# Patient Record
Sex: MALE | Race: WHITE | NOT HISPANIC OR LATINO | URBAN - METROPOLITAN AREA
[De-identification: names, ages, dates, MRNs, and addresses within clinical notes are randomized per-mention and may not be internally consistent; named-entity substitution may affect disease eponyms.]

---

## 2017-08-25 ENCOUNTER — INPATIENT (INPATIENT)
Facility: HOSPITAL | Age: 52
LOS: 3 days | Discharge: HOME | DRG: 271 | End: 2017-08-29
Attending: INTERNAL MEDICINE | Admitting: INTERNAL MEDICINE
Payer: MEDICAID

## 2017-08-25 VITALS — HEART RATE: 60 BPM | WEIGHT: 199.3 LBS | HEIGHT: 74 IN | OXYGEN SATURATION: 96 %

## 2017-08-25 DIAGNOSIS — I50.9 HEART FAILURE, UNSPECIFIED: ICD-10-CM

## 2017-08-25 DIAGNOSIS — I21.09 ST ELEVATION (STEMI) MYOCARDIAL INFARCTION INVOLVING OTHER CORONARY ARTERY OF ANTERIOR WALL: ICD-10-CM

## 2017-08-25 DIAGNOSIS — I23.8 OTHER CURRENT COMPLICATIONS FOLLOWING ACUTE MYOCARDIAL INFARCTION: ICD-10-CM

## 2017-08-25 LAB
ALBUMIN SERPL ELPH-MCNC: 3.8 G/DL — SIGNIFICANT CHANGE UP (ref 3.3–5)
ALBUMIN SERPL ELPH-MCNC: 4 G/DL — SIGNIFICANT CHANGE UP (ref 3.3–5)
ALBUMIN SERPL ELPH-MCNC: 4.1 G/DL — SIGNIFICANT CHANGE UP (ref 3.3–5)
ALP SERPL-CCNC: 61 U/L — SIGNIFICANT CHANGE UP (ref 40–120)
ALP SERPL-CCNC: 65 U/L — SIGNIFICANT CHANGE UP (ref 40–120)
ALP SERPL-CCNC: 69 U/L — SIGNIFICANT CHANGE UP (ref 40–120)
ALT FLD-CCNC: 43 U/L RC — SIGNIFICANT CHANGE UP (ref 10–45)
ALT FLD-CCNC: 85 U/L RC — HIGH (ref 10–45)
ALT FLD-CCNC: 89 U/L RC — HIGH (ref 10–45)
ANION GAP SERPL CALC-SCNC: 12 MMOL/L — SIGNIFICANT CHANGE UP (ref 5–17)
ANION GAP SERPL CALC-SCNC: 13 MMOL/L — SIGNIFICANT CHANGE UP (ref 5–17)
ANION GAP SERPL CALC-SCNC: 14 MMOL/L — SIGNIFICANT CHANGE UP (ref 5–17)
APTT BLD: 183.2 SEC — CRITICAL HIGH (ref 27.5–37.4)
APTT BLD: 56.5 SEC — HIGH (ref 27.5–37.4)
AST SERPL-CCNC: 193 U/L — HIGH (ref 10–40)
AST SERPL-CCNC: 499 U/L — HIGH (ref 10–40)
AST SERPL-CCNC: 528 U/L — HIGH (ref 10–40)
BASE EXCESS BLDMV CALC-SCNC: 4 MMOL/L — HIGH (ref -3–3)
BASE EXCESS BLDMV CALC-SCNC: 6 MMOL/L — HIGH (ref -3–3)
BASOPHILS # BLD AUTO: 0 K/UL — SIGNIFICANT CHANGE UP (ref 0–0.2)
BASOPHILS NFR BLD AUTO: 0.1 % — SIGNIFICANT CHANGE UP (ref 0–2)
BASOPHILS NFR BLD AUTO: 0.2 % — SIGNIFICANT CHANGE UP (ref 0–2)
BASOPHILS NFR BLD AUTO: 0.2 % — SIGNIFICANT CHANGE UP (ref 0–2)
BILIRUB SERPL-MCNC: 0.8 MG/DL — SIGNIFICANT CHANGE UP (ref 0.2–1.2)
BILIRUB SERPL-MCNC: 0.8 MG/DL — SIGNIFICANT CHANGE UP (ref 0.2–1.2)
BILIRUB SERPL-MCNC: 0.9 MG/DL — SIGNIFICANT CHANGE UP (ref 0.2–1.2)
BLD GP AB SCN SERPL QL: NEGATIVE — SIGNIFICANT CHANGE UP
BUN SERPL-MCNC: 11 MG/DL — SIGNIFICANT CHANGE UP (ref 7–23)
BUN SERPL-MCNC: 13 MG/DL — SIGNIFICANT CHANGE UP (ref 7–23)
BUN SERPL-MCNC: 13 MG/DL — SIGNIFICANT CHANGE UP (ref 7–23)
CALCIUM SERPL-MCNC: 8.5 MG/DL — SIGNIFICANT CHANGE UP (ref 8.4–10.5)
CALCIUM SERPL-MCNC: 8.8 MG/DL — SIGNIFICANT CHANGE UP (ref 8.4–10.5)
CALCIUM SERPL-MCNC: 9 MG/DL — SIGNIFICANT CHANGE UP (ref 8.4–10.5)
CHLORIDE SERPL-SCNC: 95 MMOL/L — LOW (ref 96–108)
CHLORIDE SERPL-SCNC: 95 MMOL/L — LOW (ref 96–108)
CHLORIDE SERPL-SCNC: 96 MMOL/L — SIGNIFICANT CHANGE UP (ref 96–108)
CHOLEST SERPL-MCNC: 200 MG/DL — HIGH (ref 10–199)
CK MB BLD-MCNC: 15.9 % — HIGH (ref 0–3.5)
CK MB BLD-MCNC: 9.8 % — HIGH (ref 0–3.5)
CK MB CFR SERPL CALC: 445.8 NG/ML — HIGH (ref 0–6.7)
CK MB CFR SERPL CALC: 600 NG/ML — HIGH (ref 0–6.7)
CK SERPL-CCNC: 2811 U/L — HIGH (ref 30–200)
CK SERPL-CCNC: 6152 U/L — HIGH (ref 30–200)
CO2 BLDMV-SCNC: 31 MMOL/L — HIGH (ref 21–29)
CO2 BLDMV-SCNC: 32 MMOL/L — HIGH (ref 21–29)
CO2 SERPL-SCNC: 26 MMOL/L — SIGNIFICANT CHANGE UP (ref 22–31)
CO2 SERPL-SCNC: 26 MMOL/L — SIGNIFICANT CHANGE UP (ref 22–31)
CO2 SERPL-SCNC: 27 MMOL/L — SIGNIFICANT CHANGE UP (ref 22–31)
CREAT SERPL-MCNC: 0.84 MG/DL — SIGNIFICANT CHANGE UP (ref 0.5–1.3)
CREAT SERPL-MCNC: 0.91 MG/DL — SIGNIFICANT CHANGE UP (ref 0.5–1.3)
CREAT SERPL-MCNC: 0.94 MG/DL — SIGNIFICANT CHANGE UP (ref 0.5–1.3)
EOSINOPHIL # BLD AUTO: 0 K/UL — SIGNIFICANT CHANGE UP (ref 0–0.5)
EOSINOPHIL NFR BLD AUTO: 0.1 % — SIGNIFICANT CHANGE UP (ref 0–6)
EOSINOPHIL NFR BLD AUTO: 0.3 % — SIGNIFICANT CHANGE UP (ref 0–6)
EOSINOPHIL NFR BLD AUTO: 0.3 % — SIGNIFICANT CHANGE UP (ref 0–6)
GAS PNL BLDMV: SIGNIFICANT CHANGE UP
GAS PNL BLDMV: SIGNIFICANT CHANGE UP
GLUCOSE SERPL-MCNC: 135 MG/DL — HIGH (ref 70–99)
GLUCOSE SERPL-MCNC: 135 MG/DL — HIGH (ref 70–99)
GLUCOSE SERPL-MCNC: 136 MG/DL — HIGH (ref 70–99)
HBA1C BLD-MCNC: 5.6 % — SIGNIFICANT CHANGE UP (ref 4–5.6)
HCO3 BLDMV-SCNC: 29 MMOL/L — HIGH (ref 20–28)
HCO3 BLDMV-SCNC: 31 MMOL/L — HIGH (ref 20–28)
HCT VFR BLD CALC: 40.8 % — SIGNIFICANT CHANGE UP (ref 39–50)
HCT VFR BLD CALC: 42 % — SIGNIFICANT CHANGE UP (ref 39–50)
HCT VFR BLD CALC: 43.5 % — SIGNIFICANT CHANGE UP (ref 39–50)
HDLC SERPL-MCNC: 60 MG/DL — SIGNIFICANT CHANGE UP (ref 40–125)
HGB BLD-MCNC: 14.1 G/DL — SIGNIFICANT CHANGE UP (ref 13–17)
HGB BLD-MCNC: 14.6 G/DL — SIGNIFICANT CHANGE UP (ref 13–17)
HGB BLD-MCNC: 14.8 G/DL — SIGNIFICANT CHANGE UP (ref 13–17)
HOROWITZ INDEX BLDMV+IHG-RTO: 21 — SIGNIFICANT CHANGE UP
HOROWITZ INDEX BLDMV+IHG-RTO: 21 — SIGNIFICANT CHANGE UP
INR BLD: 1.04 RATIO — SIGNIFICANT CHANGE UP (ref 0.88–1.16)
INR BLD: 1.09 RATIO — SIGNIFICANT CHANGE UP (ref 0.88–1.16)
INR BLD: 1.1 RATIO — SIGNIFICANT CHANGE UP (ref 0.88–1.16)
LIPID PNL WITH DIRECT LDL SERPL: 130 MG/DL — HIGH
LYMPHOCYTES # BLD AUTO: 1.7 K/UL — SIGNIFICANT CHANGE UP (ref 1–3.3)
LYMPHOCYTES # BLD AUTO: 1.8 K/UL — SIGNIFICANT CHANGE UP (ref 1–3.3)
LYMPHOCYTES # BLD AUTO: 11.6 % — LOW (ref 13–44)
LYMPHOCYTES # BLD AUTO: 14.6 % — SIGNIFICANT CHANGE UP (ref 13–44)
LYMPHOCYTES # BLD AUTO: 15.1 % — SIGNIFICANT CHANGE UP (ref 13–44)
LYMPHOCYTES # BLD AUTO: 2 K/UL — SIGNIFICANT CHANGE UP (ref 1–3.3)
MAGNESIUM SERPL-MCNC: 1.8 MG/DL — SIGNIFICANT CHANGE UP (ref 1.6–2.6)
MAGNESIUM SERPL-MCNC: 2.3 MG/DL — SIGNIFICANT CHANGE UP (ref 1.6–2.6)
MAGNESIUM SERPL-MCNC: 2.3 MG/DL — SIGNIFICANT CHANGE UP (ref 1.6–2.6)
MCHC RBC-ENTMCNC: 32.3 PG — SIGNIFICANT CHANGE UP (ref 27–34)
MCHC RBC-ENTMCNC: 32.8 PG — SIGNIFICANT CHANGE UP (ref 27–34)
MCHC RBC-ENTMCNC: 32.9 PG — SIGNIFICANT CHANGE UP (ref 27–34)
MCHC RBC-ENTMCNC: 34 GM/DL — SIGNIFICANT CHANGE UP (ref 32–36)
MCHC RBC-ENTMCNC: 34.6 GM/DL — SIGNIFICANT CHANGE UP (ref 32–36)
MCHC RBC-ENTMCNC: 34.7 GM/DL — SIGNIFICANT CHANGE UP (ref 32–36)
MCV RBC AUTO: 94.6 FL — SIGNIFICANT CHANGE UP (ref 80–100)
MCV RBC AUTO: 94.9 FL — SIGNIFICANT CHANGE UP (ref 80–100)
MCV RBC AUTO: 95 FL — SIGNIFICANT CHANGE UP (ref 80–100)
MONOCYTES # BLD AUTO: 1.1 K/UL — HIGH (ref 0–0.9)
MONOCYTES # BLD AUTO: 1.6 K/UL — HIGH (ref 0–0.9)
MONOCYTES # BLD AUTO: 1.6 K/UL — HIGH (ref 0–0.9)
MONOCYTES NFR BLD AUTO: 11.7 % — SIGNIFICANT CHANGE UP (ref 2–14)
MONOCYTES NFR BLD AUTO: 14.2 % — HIGH (ref 2–14)
MONOCYTES NFR BLD AUTO: 7 % — SIGNIFICANT CHANGE UP (ref 2–14)
NEUTROPHILS # BLD AUTO: 10.2 K/UL — HIGH (ref 1.8–7.4)
NEUTROPHILS # BLD AUTO: 12.5 K/UL — HIGH (ref 1.8–7.4)
NEUTROPHILS # BLD AUTO: 8 K/UL — HIGH (ref 1.8–7.4)
NEUTROPHILS NFR BLD AUTO: 70.2 % — SIGNIFICANT CHANGE UP (ref 43–77)
NEUTROPHILS NFR BLD AUTO: 73.2 % — SIGNIFICANT CHANGE UP (ref 43–77)
NEUTROPHILS NFR BLD AUTO: 81.1 % — HIGH (ref 43–77)
O2 CT VFR BLD CALC: 33 MMHG — SIGNIFICANT CHANGE UP (ref 30–65)
O2 CT VFR BLD CALC: 34 MMHG — SIGNIFICANT CHANGE UP (ref 30–65)
PCO2 BLDMV: 46 MMHG — SIGNIFICANT CHANGE UP (ref 30–65)
PCO2 BLDMV: 49 MMHG — SIGNIFICANT CHANGE UP (ref 30–65)
PH BLDMV: 7.4 — SIGNIFICANT CHANGE UP (ref 7.32–7.45)
PH BLDMV: 7.44 — SIGNIFICANT CHANGE UP (ref 7.32–7.45)
PHOSPHATE SERPL-MCNC: 3.2 MG/DL — SIGNIFICANT CHANGE UP (ref 2.5–4.5)
PHOSPHATE SERPL-MCNC: 3.5 MG/DL — SIGNIFICANT CHANGE UP (ref 2.5–4.5)
PHOSPHATE SERPL-MCNC: 3.5 MG/DL — SIGNIFICANT CHANGE UP (ref 2.5–4.5)
PLATELET # BLD AUTO: 205 K/UL — SIGNIFICANT CHANGE UP (ref 150–400)
PLATELET # BLD AUTO: 217 K/UL — SIGNIFICANT CHANGE UP (ref 150–400)
PLATELET # BLD AUTO: 233 K/UL — SIGNIFICANT CHANGE UP (ref 150–400)
POTASSIUM SERPL-MCNC: 3.9 MMOL/L — SIGNIFICANT CHANGE UP (ref 3.5–5.3)
POTASSIUM SERPL-MCNC: 3.9 MMOL/L — SIGNIFICANT CHANGE UP (ref 3.5–5.3)
POTASSIUM SERPL-MCNC: 4 MMOL/L — SIGNIFICANT CHANGE UP (ref 3.5–5.3)
POTASSIUM SERPL-SCNC: 3.9 MMOL/L — SIGNIFICANT CHANGE UP (ref 3.5–5.3)
POTASSIUM SERPL-SCNC: 3.9 MMOL/L — SIGNIFICANT CHANGE UP (ref 3.5–5.3)
POTASSIUM SERPL-SCNC: 4 MMOL/L — SIGNIFICANT CHANGE UP (ref 3.5–5.3)
PROT SERPL-MCNC: 6.3 G/DL — SIGNIFICANT CHANGE UP (ref 6–8.3)
PROT SERPL-MCNC: 6.5 G/DL — SIGNIFICANT CHANGE UP (ref 6–8.3)
PROT SERPL-MCNC: 6.6 G/DL — SIGNIFICANT CHANGE UP (ref 6–8.3)
PROTHROM AB SERPL-ACNC: 11.2 SEC — SIGNIFICANT CHANGE UP (ref 9.8–12.7)
PROTHROM AB SERPL-ACNC: 11.8 SEC — SIGNIFICANT CHANGE UP (ref 9.8–12.7)
PROTHROM AB SERPL-ACNC: 11.9 SEC — SIGNIFICANT CHANGE UP (ref 9.8–12.7)
RBC # BLD: 4.29 M/UL — SIGNIFICANT CHANGE UP (ref 4.2–5.8)
RBC # BLD: 4.44 M/UL — SIGNIFICANT CHANGE UP (ref 4.2–5.8)
RBC # BLD: 4.59 M/UL — SIGNIFICANT CHANGE UP (ref 4.2–5.8)
RBC # FLD: 11.4 % — SIGNIFICANT CHANGE UP (ref 10.3–14.5)
RBC # FLD: 11.4 % — SIGNIFICANT CHANGE UP (ref 10.3–14.5)
RBC # FLD: 11.5 % — SIGNIFICANT CHANGE UP (ref 10.3–14.5)
RH IG SCN BLD-IMP: POSITIVE — SIGNIFICANT CHANGE UP
SAO2 % BLDMV: 60 % — SIGNIFICANT CHANGE UP (ref 60–90)
SAO2 % BLDMV: 62 % — SIGNIFICANT CHANGE UP (ref 60–90)
SODIUM SERPL-SCNC: 134 MMOL/L — LOW (ref 135–145)
SODIUM SERPL-SCNC: 135 MMOL/L — SIGNIFICANT CHANGE UP (ref 135–145)
SODIUM SERPL-SCNC: 135 MMOL/L — SIGNIFICANT CHANGE UP (ref 135–145)
TOTAL CHOLESTEROL/HDL RATIO MEASUREMENT: 3.3 RATIO — LOW (ref 3.4–9.6)
TRIGL SERPL-MCNC: 51 MG/DL — SIGNIFICANT CHANGE UP (ref 10–149)
TROPONIN T SERPL-MCNC: 1.79 NG/ML — HIGH (ref 0–0.06)
TROPONIN T SERPL-MCNC: 8.04 NG/ML — HIGH (ref 0–0.06)
TSH SERPL-MCNC: 1.61 UIU/ML — SIGNIFICANT CHANGE UP (ref 0.27–4.2)
WBC # BLD: 11.4 K/UL — HIGH (ref 3.8–10.5)
WBC # BLD: 13.9 K/UL — HIGH (ref 3.8–10.5)
WBC # BLD: 15.4 K/UL — HIGH (ref 3.8–10.5)
WBC # FLD AUTO: 11.4 K/UL — HIGH (ref 3.8–10.5)
WBC # FLD AUTO: 13.9 K/UL — HIGH (ref 3.8–10.5)
WBC # FLD AUTO: 15.4 K/UL — HIGH (ref 3.8–10.5)

## 2017-08-25 PROCEDURE — 93010 ELECTROCARDIOGRAM REPORT: CPT

## 2017-08-25 PROCEDURE — 92978 ENDOLUMINL IVUS OCT C 1ST: CPT | Mod: 26,LD,GC

## 2017-08-25 PROCEDURE — 99223 1ST HOSP IP/OBS HIGH 75: CPT

## 2017-08-25 PROCEDURE — 33967 INSERT I-AORT PERCUT DEVICE: CPT

## 2017-08-25 PROCEDURE — 93460 R&L HRT ART/VENTRICLE ANGIO: CPT | Mod: 26,59,GC

## 2017-08-25 PROCEDURE — 71010: CPT | Mod: 26

## 2017-08-25 PROCEDURE — 93306 TTE W/DOPPLER COMPLETE: CPT | Mod: 26

## 2017-08-25 PROCEDURE — 76937 US GUIDE VASCULAR ACCESS: CPT | Mod: 26,GC

## 2017-08-25 PROCEDURE — 92941 PRQ TRLML REVSC TOT OCCL AMI: CPT | Mod: LD,GC

## 2017-08-25 RX ORDER — ASPIRIN/CALCIUM CARB/MAGNESIUM 324 MG
650 TABLET ORAL ONCE
Qty: 0 | Refills: 0 | Status: COMPLETED | OUTPATIENT
Start: 2017-08-25 | End: 2017-08-25

## 2017-08-25 RX ORDER — HEPARIN SODIUM 5000 [USP'U]/ML
1000 INJECTION INTRAVENOUS; SUBCUTANEOUS
Qty: 25000 | Refills: 0 | Status: DISCONTINUED | OUTPATIENT
Start: 2017-08-25 | End: 2017-08-26

## 2017-08-25 RX ORDER — MAGNESIUM SULFATE 500 MG/ML
2 VIAL (ML) INJECTION ONCE
Qty: 0 | Refills: 0 | Status: COMPLETED | OUTPATIENT
Start: 2017-08-25 | End: 2017-08-25

## 2017-08-25 RX ORDER — POTASSIUM CHLORIDE 20 MEQ
20 PACKET (EA) ORAL
Qty: 0 | Refills: 0 | Status: COMPLETED | OUTPATIENT
Start: 2017-08-25 | End: 2017-08-25

## 2017-08-25 RX ORDER — HEPARIN SODIUM 5000 [USP'U]/ML
3500 INJECTION INTRAVENOUS; SUBCUTANEOUS EVERY 6 HOURS
Qty: 0 | Refills: 0 | Status: DISCONTINUED | OUTPATIENT
Start: 2017-08-25 | End: 2017-08-26

## 2017-08-25 RX ORDER — HEPARIN SODIUM 5000 [USP'U]/ML
7000 INJECTION INTRAVENOUS; SUBCUTANEOUS EVERY 6 HOURS
Qty: 0 | Refills: 0 | Status: DISCONTINUED | OUTPATIENT
Start: 2017-08-25 | End: 2017-08-26

## 2017-08-25 RX ORDER — ALPRAZOLAM 0.25 MG
0.25 TABLET ORAL
Qty: 0 | Refills: 0 | Status: DISCONTINUED | OUTPATIENT
Start: 2017-08-25 | End: 2017-08-29

## 2017-08-25 RX ORDER — ATORVASTATIN CALCIUM 80 MG/1
80 TABLET, FILM COATED ORAL AT BEDTIME
Qty: 0 | Refills: 0 | Status: DISCONTINUED | OUTPATIENT
Start: 2017-08-25 | End: 2017-08-29

## 2017-08-25 RX ORDER — TICAGRELOR 90 MG/1
90 TABLET ORAL
Qty: 0 | Refills: 0 | Status: DISCONTINUED | OUTPATIENT
Start: 2017-08-25 | End: 2017-08-29

## 2017-08-25 RX ORDER — ASPIRIN/CALCIUM CARB/MAGNESIUM 324 MG
81 TABLET ORAL DAILY
Qty: 0 | Refills: 0 | Status: DISCONTINUED | OUTPATIENT
Start: 2017-08-25 | End: 2017-08-29

## 2017-08-25 RX ORDER — METOPROLOL TARTRATE 50 MG
12.5 TABLET ORAL
Qty: 0 | Refills: 0 | Status: DISCONTINUED | OUTPATIENT
Start: 2017-08-25 | End: 2017-08-28

## 2017-08-25 RX ORDER — HEPARIN SODIUM 5000 [USP'U]/ML
INJECTION INTRAVENOUS; SUBCUTANEOUS
Qty: 25000 | Refills: 0 | Status: DISCONTINUED | OUTPATIENT
Start: 2017-08-25 | End: 2017-08-25

## 2017-08-25 RX ADMIN — Medication 12.5 MILLIGRAM(S): at 18:15

## 2017-08-25 RX ADMIN — HEPARIN SODIUM 1000 UNIT(S)/HR: 5000 INJECTION INTRAVENOUS; SUBCUTANEOUS at 15:04

## 2017-08-25 RX ADMIN — TICAGRELOR 90 MILLIGRAM(S): 90 TABLET ORAL at 16:48

## 2017-08-25 RX ADMIN — ATORVASTATIN CALCIUM 80 MILLIGRAM(S): 80 TABLET, FILM COATED ORAL at 22:48

## 2017-08-25 RX ADMIN — Medication 50 GRAM(S): at 11:47

## 2017-08-25 RX ADMIN — Medication 0.25 MILLIGRAM(S): at 16:48

## 2017-08-25 RX ADMIN — Medication 20 MILLIEQUIVALENT(S): at 11:46

## 2017-08-25 RX ADMIN — Medication 650 MILLIGRAM(S): at 16:48

## 2017-08-25 RX ADMIN — HEPARIN SODIUM 1200 UNIT(S)/HR: 5000 INJECTION INTRAVENOUS; SUBCUTANEOUS at 18:16

## 2017-08-25 RX ADMIN — Medication 81 MILLIGRAM(S): at 11:46

## 2017-08-25 RX ADMIN — Medication 20 MILLIEQUIVALENT(S): at 15:03

## 2017-08-25 RX ADMIN — HEPARIN SODIUM 1600 UNIT(S)/HR: 5000 INJECTION INTRAVENOUS; SUBCUTANEOUS at 09:24

## 2017-08-25 NOTE — H&P ADULT - ATTENDING COMMENTS
52 year-old man with LAD infarct, PCWP=31 mmHg. IABP and swan-diego placed. Patient given IV lasix with improvement of his volume status. Augmented BPs=80s-90s mmHg. TTE with IV contrast to rule out LV thrombus.

## 2017-08-25 NOTE — PROGRESS NOTE ADULT - ASSESSMENT
55 y/o M with no significant medical history, recently stopped smoking, recent negative cardiac workup presents with 3 days of intermittent resting chest pain.  Found to have AW STEMI , RCA disease with elevated filling pressures requiring IABP

## 2017-08-25 NOTE — H&P ADULT - ASSESSMENT
55 Y/O Male with no significant medical or surgical history, recently stopped smoking.  Past 3 days pt had  progressive mid sternal chest pain at rest.  This evening chest pain was unrelenting and he was found to have an AW STEMI at outside hospital. Denies palpitations or radiation.  Urgent cath showed 100% pLAD with 3 EZRA.  !00% RCA with collaterals.  IABP and swan were placed.  PCWP 31, CI 2.5.  Lasix 20 mgm IV was given        2 months ago had negative cardiac work up including stress test and echo due to episodes of chest pain that were not typical of current chest pain.

## 2017-08-25 NOTE — H&P ADULT - HISTORY OF PRESENT ILLNESS
55 Y/o M No significant medical history, stopped smoking past month after cardiac work-up for chest pain which proved negative. Pain at that time was dissimilar from his current  admitting chest  discomfort.   He states 3 days ago he developed mid sternal chest pain with associated diaphoresis while at rest, pain lasted up to 30 minutes.  The following day it reoccurred and  lasted fa little longer.   Last evening it reoccurred with greater intensity and duration.  On presentation to [place] it was noted he had an Anterior wall STEMI.  He was loaded with Clopidogril, Aspirin, Heparin and MSO4 and transferred to [facility] for urgent cardiac catheterization.  Cath showed 100% stenosis in p LAD.  EZRA X3.  RCA was 100% with collaterals .  PCWP 31.  CO 2.54 PA Sat 69% PAP 47/26/35. IABP support and right hear cath was placed.   Lasix 20 mgm IV was given. 55 Y/o M No significant medical history, stopped smoking past month after cardiac work-up for chest pain which proved negative. Pain at that time was dissimilar from his current  admitting chest  discomfort.   He states 3 days ago he developed mid sternal chest pain with associated diaphoresis while at rest, pain lasted up to 30 minutes.  The following day it reoccurred and  lasted fa little longer.   Last evening it reoccurred with greater intensity and duration.  On presentation to [place] it was noted he had an Anterior wall STEMI.  He was loaded with Clopidogril, Aspirin, Heparin and MSO4 and transferred to [facility] for urgent cardiac catheterization.  Cath showed 100% stenosis in p LAD.  EZRA X3.  RCA was 100% with collaterals .  PCWP 31.  CI 2.54 PA Sat 69% PAP 47/26/35. IABP support and right heart cath was placed.   Lasix 20 mgm IV was given. 53 Y/o M No significant medical history, stopped smoking past month after cardiac work-up for chest pain which proved negative. Pain at that time was dissimilar from his current  admitting chest  discomfort.   He states 3 days ago he developed mid sternal chest pain with associated diaphoresis while at rest, pain lasted up to 30 minutes.  The following day it reoccurred and  lasted fa little longer.   Last evening it reoccurred with greater intensity and duration.  On presentation to [facility] it was noted he had an Anterior wall STEMI.  He was loaded with Clopidogril, Aspirin, Heparin and MSO4 and transferred to [facility] for urgent cardiac catheterization.  Cath showed 100% stenosis in p LAD.  EZRA X3.  RCA was 100% with collaterals .  PCWP 31.  CI 2.54 PA Sat 69% PAP 47/26/35. IABP support and right heart cath was placed.   Lasix 20 mgm IV was given.

## 2017-08-25 NOTE — PROGRESS NOTE ADULT - SUBJECTIVE AND OBJECTIVE BOX
HPI:  57 Y/o M No significant medical history, stopped smoking past month after cardiac work-up for chest pain which proved negative. Pain at that time was dissimilar from his current  admitting chest  discomfort.   He states 3 days ago he developed mid sternal chest pain with associated diaphoresis while at rest, pain lasted up to 30 minutes.  The following day it reoccurred and  lasted fa little longer.   Last evening it reoccurred with greater intensity and duration.  On presentation to [facility] it was noted he had an Anterior wall STEMI.  He was loaded with Clopidogril, Aspirin, Heparin and MSO4 and transferred to [facility] for urgent cardiac catheterization.  Cath showed 100% stenosis in p LAD.  EZRA X3.  RCA was 100% with collaterals .  PCWP 31.  CO 2.54 PA Sat 69% PAP 47/26/35. IABP support and right hear cath was placed.   Lasix 20 mgm IV was given. (25 Aug 2017 07:49)        Overnight Events:admitted 7 AM.  Lasix 20 mgm IV given for elevated filling pressurs    OBJECTIVE:  Review Of Systems:    Constitutional: No chest pain   Respiratory: Denies dyspnea   Cardiovascular: No chest pain        Allergy:  Allergies    No Known Allergies    Intolerances        Medications:  MEDICATIONS  (STANDING):  aspirin enteric coated 81 milliGRAM(s) Oral daily  ticagrelor 90 milliGRAM(s) Oral two times a day  atorvastatin 80 milliGRAM(s) Oral at bedtime  heparin  Infusion.  Unit(s)/Hr (16 mL/Hr) IV Continuous <Continuous>    PMH/PSH/FH/SH: [ ] Unchanged    Vitals:  T(C): --  HR: 68 (08-25-17 @ 08:00) (68 - 68)  BP: --augmented 80-90   BP(mean): --  RR: --18  SpO2: --  CVP 7-8  PAP 35/25  PCWP 18-20  Wt(kg): --  Daily Height in cm: 187.96 (25 Aug 2017 07:15)    Daily   I&O's Summary      Labs:                            ECG:Sinus rhythm PVC's .  Poor r wave progression     Echo:    Stress Testing:     Cath: EZRA X 3 to  p LAD.  RCA with collaterals     Imaging:    Interpretation of Telemetry:Sinus rhythm with PVC's      Physical Exam:    Appearance: well developed   Cardiovascular:S1 S2 without gallop  or murmur   Procedural Access Site:Right groin with IABP and [place]  Respiratory:Lungs clear  Gastrointestinal: Soft ND, NT  Extremities: (+) Distal LE pulses

## 2017-08-25 NOTE — CHART NOTE - NSCHARTNOTEFT_GEN_A_CORE
====================  SUMMARY:  ====================      ====================  NEW EVENTS:  ====================      ====================  VITALS:  ====================    ICU Vital Signs Last 24 Hrs  T(C): 36.6 (25 Aug 2017 19:00), Max: 36.6 (25 Aug 2017 19:00)  T(F): 97.9 (25 Aug 2017 19:00), Max: 97.9 (25 Aug 2017 19:00)  HR: 58 (25 Aug 2017 22:30) (58 - 78)  BP: 82/61 (25 Aug 2017 19:45) (82/61 - 82/61)  BP(mean): 68 (25 Aug 2017 19:45) (68 - 68)  ABP: --  ABP(mean): --  RR: 18 (25 Aug 2017 22:30) (16 - 20)  SpO2: 95% (25 Aug 2017 22:30) (91% - 99%)      I&O's Summary    25 Aug 2017 07:01  -  25 Aug 2017 23:18  --------------------------------------------------------  IN: 896 mL / OUT: 2950 mL / NET: -2054 mL    ====================  LABS:  ====================                          14.6   13.9  )-----------( 217      ( 25 Aug 2017 17:09 )             42.0     08-25    135  |  95<L>  |  13  ----------------------------<  135<H>  4.0   |  26  |  0.94    Ca    9.0      25 Aug 2017 17:09  Phos  3.5     08-25  Mg     2.3     08-25    TPro  6.5  /  Alb  4.0  /  TBili  0.8  /  DBili  x   /  AST  528<H>  /  ALT  85<H>  /  AlkPhos  65  08-25    PT/INR - ( 25 Aug 2017 17:09 )   PT: 11.2 sec;   INR: 1.04 ratio         PTT - ( 25 Aug 2017 17:09 )  PTT:56.5 sec  Troponin T, Serum: 8.04 ng/mL <H> (08-25-17 @ 17:09)  Creatine Kinase, Serum: 6152 U/L <H> (08-25-17 @ 17:09)  Creatine Kinase, Serum: 2811 U/L <H> (08-25-17 @ 09:16)  Troponin T, Serum: 1.79 ng/mL <H> (08-25-17 @ 09:16)        ====================  PLAN:  ====================      Shmuel Hernandez M.D.  Medicine Resident, PGY-2  Pager: [phone] ====================  SUMMARY:  ====================  57 y/o M with no significant medical history, recently stopped smoking, recent negative cardiac workup presents with 3 days of intermittent resting chest pain.  Found to have AW STEMI , RCA disease with elevated filling pressures requiring IABP    ====================  NEW EVENTS:  ====================  Several short episodes of venticular arrhythmias, given Amio bolus. Currently CP free. IABP in place.     ====================  VITALS:  ====================    ICU Vital Signs Last 24 Hrs  T(C): 36.6 (25 Aug 2017 19:00), Max: 36.6 (25 Aug 2017 19:00)  T(F): 97.9 (25 Aug 2017 19:00), Max: 97.9 (25 Aug 2017 19:00)  HR: 58 (25 Aug 2017 22:30) (58 - 78)  BP: 82/61 (25 Aug 2017 19:45) (82/61 - 82/61)  BP(mean): 68 (25 Aug 2017 19:45) (68 - 68)  ABP: --  ABP(mean): --  RR: 18 (25 Aug 2017 22:30) (16 - 20)  SpO2: 95% (25 Aug 2017 22:30) (91% - 99%)      I&O's Summary    25 Aug 2017 07:01  -  25 Aug 2017 23:18  --------------------------------------------------------  IN: 896 mL / OUT: 2950 mL / NET: -2054 mL    ====================  LABS:  ====================                          14.6   13.9  )-----------( 217      ( 25 Aug 2017 17:09 )             42.0     08-25    135  |  95<L>  |  13  ----------------------------<  135<H>  4.0   |  26  |  0.94    Ca    9.0      25 Aug 2017 17:09  Phos  3.5     08-25  Mg     2.3     08-25    TPro  6.5  /  Alb  4.0  /  TBili  0.8  /  DBili  x   /  AST  528<H>  /  ALT  85<H>  /  AlkPhos  65  08-25    PT/INR - ( 25 Aug 2017 17:09 )   PT: 11.2 sec;   INR: 1.04 ratio         PTT - ( 25 Aug 2017 17:09 )  PTT:56.5 sec  Troponin T, Serum: 8.04 ng/mL <H> (08-25-17 @ 17:09)  Creatine Kinase, Serum: 6152 U/L <H> (08-25-17 @ 17:09)  Creatine Kinase, Serum: 2811 U/L <H> (08-25-17 @ 09:16)  Troponin T, Serum: 1.79 ng/mL <H> (08-25-17 @ 09:16)        ====================  PLAN:  ====================  #AWSTEMI: s/p EZRA x3. On ASA/Brilinta, BB, and Statin. will add ACEI as BP allows. will need repeat TTE after 48 hrs to r/o LV thrombus. continue to trend CE    #CHF: EF 20-25%, severe LV dysfunction. IABP to augment pressures    #Arrhythmias: post-MI, including NSVT/AIVR. monitor on Tele    Shmuel Hernandez M.D.  Medicine Resident, PGY-2  Pager: [phone] ====================  SUMMARY:  ====================  57 y/o M with no significant medical history, recently stopped smoking, recent negative cardiac workup presents with 3 days of intermittent resting chest pain.  Found to have AW STEMI , RCA disease with elevated filling pressures requiring IABP    ====================  NEW EVENTS:  ====================  Several short episodes of venticular arrhythmias, given Amio bolus. Currently CP free. IABP in place.     ====================  VITALS:  ====================    ICU Vital Signs Last 24 Hrs  T(C): 36.6 (25 Aug 2017 19:00), Max: 36.6 (25 Aug 2017 19:00)  T(F): 97.9 (25 Aug 2017 19:00), Max: 97.9 (25 Aug 2017 19:00)  HR: 58 (25 Aug 2017 22:30) (58 - 78)  BP: 82/61 (25 Aug 2017 19:45) (82/61 - 82/61)  BP(mean): 68 (25 Aug 2017 19:45) (68 - 68)  ABP: --   RR: 18 (25 Aug 2017 22:30) (16 - 20)  SpO2: 95% (25 Aug 2017 22:30) (91% - 99%)      I&O's Summary    25 Aug 2017 07:01  -  25 Aug 2017 23:18  --------------------------------------------------------  IN: 896 mL / OUT: 2950 mL / NET: -2054 mL    ====================  LABS:  ====================                          14.6   13.9  )-----------( 217      ( 25 Aug 2017 17:09 )             42.0     08-25    135  |  95<L>  |  13  ----------------------------<  135<H>  4.0   |  26  |  0.94    Ca    9.0      25 Aug 2017 17:09  Phos  3.5     08-25  Mg     2.3     08-25    TPro  6.5  /  Alb  4.0  /  TBili  0.8  /  DBili  x   /  AST  528<H>  /  ALT  85<H>  /  AlkPhos  65  08-25    PT/INR - ( 25 Aug 2017 17:09 )   PT: 11.2 sec;   INR: 1.04 ratio         PTT - ( 25 Aug 2017 17:09 )  PTT:56.5 sec  Troponin T, Serum: 8.04 ng/mL <H> (08-25-17 @ 17:09)  Creatine Kinase, Serum: 6152 U/L <H> (08-25-17 @ 17:09)  Creatine Kinase, Serum: 2811 U/L <H> (08-25-17 @ 09:16)  Troponin T, Serum: 1.79 ng/mL <H> (08-25-17 @ 09:16)        ====================  PLAN:  ====================  #AWSTEMI: s/p EZRA x3. On ASA/Brilinta, BB, and Statin. will add ACEI as BP allows. will need repeat TTE after 48 hrs to r/o LV thrombus. CE have peaked    #CHF: EF 20-25%, severe LV dysfunction. IABP to augment pressures    #Arrhythmias: post-MI, including NSVT/AIVR. monitor on Tele    Shmuel Hernandez M.D.  Medicine Resident, PGY-2  Pager: [phone]

## 2017-08-26 DIAGNOSIS — I47.2 VENTRICULAR TACHYCARDIA: ICD-10-CM

## 2017-08-26 LAB
ANION GAP SERPL CALC-SCNC: 11 MMOL/L — SIGNIFICANT CHANGE UP (ref 5–17)
APTT BLD: 76.4 SEC — HIGH (ref 27.5–37.4)
APTT BLD: 76.5 SEC — HIGH (ref 27.5–37.4)
BASE EXCESS BLDMV CALC-SCNC: 6.4 MMOL/L — HIGH (ref -3–3)
BASOPHILS # BLD AUTO: 0 K/UL — SIGNIFICANT CHANGE UP (ref 0–0.2)
BASOPHILS NFR BLD AUTO: 0.5 % — SIGNIFICANT CHANGE UP (ref 0–2)
BUN SERPL-MCNC: 10 MG/DL — SIGNIFICANT CHANGE UP (ref 7–23)
CALCIUM SERPL-MCNC: 8.5 MG/DL — SIGNIFICANT CHANGE UP (ref 8.4–10.5)
CHLORIDE SERPL-SCNC: 100 MMOL/L — SIGNIFICANT CHANGE UP (ref 96–108)
CK MB BLD-MCNC: 4.6 % — HIGH (ref 0–3.5)
CK MB BLD-MCNC: 9.4 % — HIGH (ref 0–3.5)
CK MB CFR SERPL CALC: 465.3 NG/ML — HIGH (ref 0–6.7)
CK MB CFR SERPL CALC: 84.6 NG/ML — HIGH (ref 0–6.7)
CK SERPL-CCNC: 1830 U/L — HIGH (ref 30–200)
CK SERPL-CCNC: 4924 U/L — HIGH (ref 30–200)
CO2 BLDMV-SCNC: 33 MMOL/L — HIGH (ref 21–29)
CO2 SERPL-SCNC: 26 MMOL/L — SIGNIFICANT CHANGE UP (ref 22–31)
CREAT SERPL-MCNC: 0.87 MG/DL — SIGNIFICANT CHANGE UP (ref 0.5–1.3)
EOSINOPHIL # BLD AUTO: 0.1 K/UL — SIGNIFICANT CHANGE UP (ref 0–0.5)
EOSINOPHIL NFR BLD AUTO: 0.6 % — SIGNIFICANT CHANGE UP (ref 0–6)
GAS PNL BLDMV: SIGNIFICANT CHANGE UP
GLUCOSE SERPL-MCNC: 117 MG/DL — HIGH (ref 70–99)
HCO3 BLDMV-SCNC: 32 MMOL/L — HIGH (ref 20–28)
HCT VFR BLD CALC: 40.8 % — SIGNIFICANT CHANGE UP (ref 39–50)
HGB BLD-MCNC: 14.2 G/DL — SIGNIFICANT CHANGE UP (ref 13–17)
HOROWITZ INDEX BLDMV+IHG-RTO: 21 — SIGNIFICANT CHANGE UP
LYMPHOCYTES # BLD AUTO: 2.6 K/UL — SIGNIFICANT CHANGE UP (ref 1–3.3)
LYMPHOCYTES # BLD AUTO: 25.9 % — SIGNIFICANT CHANGE UP (ref 13–44)
MAGNESIUM SERPL-MCNC: 2.2 MG/DL — SIGNIFICANT CHANGE UP (ref 1.6–2.6)
MCHC RBC-ENTMCNC: 33.5 PG — SIGNIFICANT CHANGE UP (ref 27–34)
MCHC RBC-ENTMCNC: 34.9 GM/DL — SIGNIFICANT CHANGE UP (ref 32–36)
MCV RBC AUTO: 96.1 FL — SIGNIFICANT CHANGE UP (ref 80–100)
MONOCYTES # BLD AUTO: 1.3 K/UL — HIGH (ref 0–0.9)
MONOCYTES NFR BLD AUTO: 12.7 % — SIGNIFICANT CHANGE UP (ref 2–14)
NEUTROPHILS # BLD AUTO: 6 K/UL — SIGNIFICANT CHANGE UP (ref 1.8–7.4)
NEUTROPHILS NFR BLD AUTO: 60.3 % — SIGNIFICANT CHANGE UP (ref 43–77)
O2 CT VFR BLD CALC: 34 MMHG — SIGNIFICANT CHANGE UP (ref 30–65)
PCO2 BLDMV: 49 MMHG — SIGNIFICANT CHANGE UP (ref 30–65)
PH BLDMV: 7.42 — SIGNIFICANT CHANGE UP (ref 7.32–7.45)
PHOSPHATE SERPL-MCNC: 2.9 MG/DL — SIGNIFICANT CHANGE UP (ref 2.5–4.5)
PLATELET # BLD AUTO: 206 K/UL — SIGNIFICANT CHANGE UP (ref 150–400)
POTASSIUM SERPL-MCNC: 4 MMOL/L — SIGNIFICANT CHANGE UP (ref 3.5–5.3)
POTASSIUM SERPL-SCNC: 4 MMOL/L — SIGNIFICANT CHANGE UP (ref 3.5–5.3)
RBC # BLD: 4.25 M/UL — SIGNIFICANT CHANGE UP (ref 4.2–5.8)
RBC # FLD: 11.4 % — SIGNIFICANT CHANGE UP (ref 10.3–14.5)
SAO2 % BLDMV: 62 % — SIGNIFICANT CHANGE UP (ref 60–90)
SODIUM SERPL-SCNC: 137 MMOL/L — SIGNIFICANT CHANGE UP (ref 135–145)
TROPONIN T SERPL-MCNC: 7.45 NG/ML — HIGH (ref 0–0.06)
TROPONIN T SERPL-MCNC: 8.36 NG/ML — HIGH (ref 0–0.06)
WBC # BLD: 10 K/UL — SIGNIFICANT CHANGE UP (ref 3.8–10.5)
WBC # FLD AUTO: 10 K/UL — SIGNIFICANT CHANGE UP (ref 3.8–10.5)

## 2017-08-26 PROCEDURE — 71010: CPT | Mod: 26

## 2017-08-26 PROCEDURE — 93010 ELECTROCARDIOGRAM REPORT: CPT

## 2017-08-26 PROCEDURE — 99233 SBSQ HOSP IP/OBS HIGH 50: CPT

## 2017-08-26 RX ORDER — SODIUM CHLORIDE 9 MG/ML
250 INJECTION INTRAMUSCULAR; INTRAVENOUS; SUBCUTANEOUS ONCE
Qty: 0 | Refills: 0 | Status: COMPLETED | OUTPATIENT
Start: 2017-08-26 | End: 2017-08-26

## 2017-08-26 RX ORDER — ACETAMINOPHEN 500 MG
650 TABLET ORAL ONCE
Qty: 0 | Refills: 0 | Status: COMPLETED | OUTPATIENT
Start: 2017-08-26 | End: 2017-08-26

## 2017-08-26 RX ADMIN — HEPARIN SODIUM 1200 UNIT(S)/HR: 5000 INJECTION INTRAVENOUS; SUBCUTANEOUS at 08:18

## 2017-08-26 RX ADMIN — Medication 12.5 MILLIGRAM(S): at 06:39

## 2017-08-26 RX ADMIN — Medication 650 MILLIGRAM(S): at 02:08

## 2017-08-26 RX ADMIN — Medication 650 MILLIGRAM(S): at 02:30

## 2017-08-26 RX ADMIN — ATORVASTATIN CALCIUM 80 MILLIGRAM(S): 80 TABLET, FILM COATED ORAL at 21:36

## 2017-08-26 RX ADMIN — HEPARIN SODIUM 1200 UNIT(S)/HR: 5000 INJECTION INTRAVENOUS; SUBCUTANEOUS at 01:10

## 2017-08-26 RX ADMIN — Medication 0.25 MILLIGRAM(S): at 01:43

## 2017-08-26 RX ADMIN — Medication 12.5 MILLIGRAM(S): at 18:17

## 2017-08-26 RX ADMIN — TICAGRELOR 90 MILLIGRAM(S): 90 TABLET ORAL at 18:17

## 2017-08-26 RX ADMIN — Medication 81 MILLIGRAM(S): at 12:17

## 2017-08-26 RX ADMIN — SODIUM CHLORIDE 500 MILLILITER(S): 9 INJECTION INTRAMUSCULAR; INTRAVENOUS; SUBCUTANEOUS at 11:17

## 2017-08-26 RX ADMIN — TICAGRELOR 90 MILLIGRAM(S): 90 TABLET ORAL at 05:21

## 2017-08-26 NOTE — CHART NOTE - NSCHARTNOTEFT_GEN_A_CORE
====================  SUMMARY:  ====================  55 y/o M with no significant medical history, recently stopped smoking, recent negative cardiac workup presents with 3 days of intermittent resting chest pain.  Found to have AW STEMI, RCA disease with elevated filling pressures requiring IABP    ====================  NEW EVENTS:  ====================  IABP and [place] removed. CE have not peaked.    ====================  VITALS:  ====================    ICU Vital Signs Last 24 Hrs  T(C): 37.2 (26 Aug 2017 19:00), Max: 37.7 (26 Aug 2017 16:00)  T(F): 98.9 (26 Aug 2017 19:00), Max: 99.9 (26 Aug 2017 16:00)  HR: 64 (26 Aug 2017 21:00) (54 - 76)  BP: 92/68 (26 Aug 2017 21:00) (81/71 - 103/73)  BP(mean): 78 (26 Aug 2017 21:00) (75 - 85)  RR: 18 (26 Aug 2017 19:00) (17 - 22)  SpO2: 97% (26 Aug 2017 19:00) (92% - 97%)      I&O's Summary    25 Aug 2017 07:01  -  26 Aug 2017 07:00  --------------------------------------------------------  IN: 992 mL / OUT: 3950 mL / NET: -2958 mL    26 Aug 2017 07:01  -  26 Aug 2017 22:04  --------------------------------------------------------  IN: 714 mL / OUT: 550 mL / NET: 164 mL        Adult Advanced Hemodynamics Last 24 Hrs  CVP(mm Hg): 19 (26 Aug 2017 21:00) (3 - 19)  CO: 4 (25 Aug 2017 23:30) (4 - 4)  CI: 1.8 (25 Aug 2017 23:30) (1.8 - 1.8)  PA: 9/5 (26 Aug 2017 21:00) (9/5 - 42/24)  PA(mean): 8 (26 Aug 2017 21:00) (8 - 30)    ====================  LABS:  ====================                          14.2   10.0  )-----------( 206      ( 26 Aug 2017 06:34 )             40.8     08-26    137  |  100  |  10  ----------------------------<  117<H>  4.0   |  26  |  0.87    Ca    8.5      26 Aug 2017 06:34  Phos  2.9     08-26  Mg     2.2     08-26    TPro  6.3  /  Alb  3.8  /  TBili  0.9  /  DBili  x   /  AST  499<H>  /  ALT  89<H>  /  AlkPhos  61  08-25    PT/INR - ( 25 Aug 2017 23:21 )   PT: 11.8 sec;   INR: 1.09 ratio         PTT - ( 26 Aug 2017 06:34 )  PTT:76.5 sec  Troponin T, Serum: 8.36 ng/mL <H> (08-26-17 @ 16:56)  Creatine Kinase, Serum: 1830 U/L <H> (08-26-17 @ 16:56)  Troponin T, Serum: 7.45 ng/mL <H> (08-25-17 @ 23:21)  Creatine Kinase, Serum: 4924 U/L <H> (08-25-17 @ 23:21)    ====================  PLAN:  ====================  #AWSTEMI: s/p EZRA x3. On ASA/Brilinta, BB, and Statin. will add ACEI as BP allows. will need repeat TTE to r/o LV thrombus. continue to trend CE    #CHF: EF 20-25%, severe LV dysfunction. will need LifeVest at discharge    #Ventricular Arrhythmias: post-MI, c/w beta-blocker. no events on Tele for >12hrs, continue to monitor    Shmuel Hernandez M.D.  Medicine Resident, PGY-2  Pager: [phone]

## 2017-08-26 NOTE — CHART NOTE - NSCHARTNOTEFT_GEN_A_CORE
Removal of Femoral Sheath (IABP)    Pulses in the right lower extremity are palpable. The patient was placed in the supine position. The insertion site was identified and the sutures were removed per protocol.  The 8 [place] femoral sheath was then removed. Direct pressure was applied for  __35_ minutes.     Monitoring of the (right/left) groin and both lower extremities including neuro-vascular checks and vital signs every 15 minutes x 4, then every 30 minutes x 2, then every 1 hour was ordered.    Complications: None/Other    Comments: The patient tolerated the pull well, the patient received 200cc if IVF during the pull

## 2017-08-26 NOTE — PROGRESS NOTE ADULT - PROBLEM SELECTOR PLAN 1
DAPT, Statin, Beta Blocker.  ACEI when B/P Allows  Follow filling pressures w [place] Brenda catheter  Plan D/C IABP today

## 2017-08-26 NOTE — PROGRESS NOTE ADULT - SUBJECTIVE AND OBJECTIVE BOX
HPI:  53 Y/o M No significant medical history, stopped smoking past month after cardiac work-up for chest pain which proved negative. Pain at that time was dissimilar from his current  admitting chest  discomfort.   He states 3 days ago he developed mid sternal chest pain with associated diaphoresis while at rest, pain lasted up to 30 minutes.  The following day it reoccurred and  lasted fa little longer.   Last evening it reoccurred with greater intensity and duration.  On presentation to [place] it was noted he had an Anterior wall STEMI.  He was loaded with Clopidogril, Aspirin, Heparin and MSO4 and transferred to [facility] for urgent cardiac catheterization.  Cath showed 100% stenosis in p LAD.  EZRA X3.  RCA was 100% with collaterals .  PCWP 31.  CI 2.54 PA Sat 69% PAP 47/26/35. IABP support and right heart cath was placed.   Lasix 20 mgm IV was given. (25 Aug 2017 07:49)        Overnight Events:Ventricular ectopy less on BB     OBJECTIVE:  Review Of Systems:    Constitutional:   Respiratory:   Cardiovascular:         Allergy:  Allergies    No Known Allergies    Intolerances        Medications:  MEDICATIONS  (STANDING):  aspirin enteric coated 81 milliGRAM(s) Oral daily  ticagrelor 90 milliGRAM(s) Oral two times a day  atorvastatin 80 milliGRAM(s) Oral at bedtime  heparin  Infusion. 1000 Unit(s)/Hr (10 mL/Hr) IV Continuous <Continuous>  metoprolol 12.5 milliGRAM(s) Oral two times a day    MEDICATIONS  (PRN):  heparin  Injectable 7000 Unit(s) IV Push every 6 hours PRN For aPTT less than 40  heparin  Injectable 3500 Unit(s) IV Push every 6 hours PRN For aPTT between 40 - 57  ALPRAZolam 0.25 milliGRAM(s) Oral four times a day PRN anxiety      PMH/PSH/FH/SH: [ ] Unchanged    Vitals:  T(C): 37.5 (17 @ 05:00), Max: 37.5 (17 @ 05:00)  HR: 556 (17 @ 07:00) (54 - 556)  BP: 82/61 (17 @ 19:45) (82/61 - 82/61)  BP(mean): 68 (17 @ 19:45) (68 - 68)  RR: 22 (17 @ 07:00) (16 - 22)  SpO2: 95% (17 @ 07:00) (91% - 99%)  Wt(kg): --  Daily     Daily   I&O's Summary    25 Aug 2017 07:01  -  26 Aug 2017 07:00  --------------------------------------------------------  IN: 968 mL / OUT: 3950 mL / NET: -2982 mL        Labs:                        14.2   10.0  )-----------( 206      ( 26 Aug 2017 06:34 )             40.8         137  |  100  |  10  ----------------------------<  117<H>  4.0   |  26  |  0.87    Ca    8.5      26 Aug 2017 06:34  Phos  2.9       Mg     2.2         TPro  6.3  /  Alb  3.8  /  TBili  0.9  /  DBili  x   /  AST  499<H>  /  ALT  89<H>  /  AlkPhos  61      PT/INR - ( 25 Aug 2017 23:21 )   PT: 11.8 sec;   INR: 1.09 ratio         PTT - ( 26 Aug 2017 06:34 )  PTT:76.5 sec  CARDIAC MARKERS ( 25 Aug 2017 23:21 )  x     / 7.45 ng/mL / 4924 U/L / x     / 465.3 ng/mL  CARDIAC MARKERS ( 25 Aug 2017 17:09 )  x     / 8.04 ng/mL / 6152 U/L / x     / 600.0 ng/mL  CARDIAC MARKERS ( 25 Aug 2017 09:16 )  x     / 1.79 ng/mL / 2811 U/L / x     / 445.8 ng/mL      Magnesium, Serum: 2.2 mg/dL ( @ 06:34)  Phosphorus Level, Serum: 2.9 mg/dL ( 06:34)  Phosphorus Level, Serum: 3.2 mg/dL ( @ 23:21)  Magnesium, Serum: 2.3 mg/dL ( @ 23:21)  Magnesium, Serum: 2.3 mg/dL ( @ 17:09)  Phosphorus Level, Serum: 3.5 mg/dL ( @ 17:09)  Magnesium, Serum: 1.8 mg/dL ( @ 09:16)  Phosphorus Level, Serum: 3.5 mg/dL ( @ 09:16)    Total Cholesterol: 200  LDL: 130  HDL: 60  T            ECG:    Echo:  < from: TTE with Doppler (w/Cont) (17 @ 10:58) >   Severe segmental left ventricular systolic dysfunction.  Severe hypokinesis/akinesis of the anteroseptum, anterior  wall., apex, distal inferior/inferolateral wall.  Endocardial visualization enhanced with intravenous    < end of copied text >    Stress Testing:     Cath: :  EZRA X 3 to pLAD. PCWP 31, 100% RCA w collaterals .  CI 2.5    Imaging:    Interpretation of Telemetry:Sinus with 1 run of NSVT      Physical Exam:    Appearance:   Cardiovascular:   Procedural Access Site:  Respiratory:  Gastrointestinal:   Extremities: HPI:  51 Y/o M No significant medical history, stopped smoking past month after cardiac work-up for chest pain which proved negative. Pain at that time was dissimilar from his current  admitting chest  discomfort.   He states 3 days ago he developed mid sternal chest pain with associated diaphoresis while at rest, pain lasted up to 30 minutes.  The following day it reoccurred and  lasted fa little longer.   Last evening it reoccurred with greater intensity and duration.  On presentation to [place] it was noted he had an Anterior wall STEMI.  He was loaded with Clopidogril, Aspirin, Heparin and MSO4 and transferred to [facility] for urgent cardiac catheterization.  Cath showed 100% stenosis in p LAD.  EZRA X3.  RCA was 100% with collaterals .  PCWP 31.  CI 2.54 PA Sat 69% PAP 47/26/35. IABP support and right heart cath was placed.   Lasix 20 mgm IV was given. (25 Aug 2017 07:49)        Overnight Events:Ventricular ectopy less on BB     OBJECTIVE:  Review Of Systems:     Constitutional:No complaints   Respiratory: Clear B/L   Cardiac:  S1S2 no M/G .  No JVD         Allergy:  Allergies    No Known Allergies    Intolerances        Medications:  MEDICATIONS  (STANDING):  aspirin enteric coated 81 milliGRAM(s) Oral daily  ticagrelor 90 milliGRAM(s) Oral two times a day  atorvastatin 80 milliGRAM(s) Oral at bedtime  heparin  Infusion. 1000 Unit(s)/Hr (10 mL/Hr) IV Continuous <Continuous>  metoprolol 12.5 milliGRAM(s) Oral two times a day    MEDICATIONS  (PRN):  heparin  Injectable 7000 Unit(s) IV Push every 6 hours PRN For aPTT less than 40  heparin  Injectable 3500 Unit(s) IV Push every 6 hours PRN For aPTT between 40 - 57  ALPRAZolam 0.25 milliGRAM(s) Oral four times a day PRN anxiety      PMH/PSH/FH/SH: [ ] Unchanged    Vitals:  T(C): 37.5 (17 @ 05:00), Max: 37.5 (17 @ 05:00)  HR: 556 (17 @ 07:00) (54 - 556)  BP: 82/61 (17 @ 19:45) (82/61 - 82/61)  BP(mean): 68 (17 @ 19:45) (68 - 68)  RR: 22 (17 @ 07:00) (16 - 22)  SpO2: 95% (17 @ 07:00) (91% - 99%)  Wt(kg): --  Daily     Daily   I&O's Summary  CVP 8  CI 2.13      25 Aug 2017 07:01  -  26 Aug 2017 07:00  --------------------------------------------------------  IN: 968 mL / OUT: 3950 mL / NET: -2982 mL        Labs: SVO2 63%                        14.2   10.0  )-----------( 206      ( 26 Aug 2017 06:34 )             40.8         137  |  100  |  10  ----------------------------<  117<H>  4.0   |  26  |  0.87    Ca    8.5      26 Aug 2017 06:34  Phos  2.9       Mg     2.2         TPro  6.3  /  Alb  3.8  /  TBili  0.9  /  DBili  x   /  AST  499<H>  /  ALT  89<H>  /  AlkPhos  61  08-25    PT/INR - ( 25 Aug 2017 23:21 )   PT: 11.8 sec;   INR: 1.09 ratio         PTT - ( 26 Aug 2017 06:34 )  PTT:76.5 sec  CARDIAC MARKERS ( 25 Aug 2017 23:21 )  x     / 7.45 ng/mL / 4924 U/L / x     / 465.3 ng/mL  CARDIAC MARKERS ( 25 Aug 2017 17:09 )  x     / 8.04 ng/mL / 6152 U/L / x     / 600.0 ng/mL  CARDIAC MARKERS ( 25 Aug 2017 09:16 )  x     / 1.79 ng/mL / 2811 U/L / x     / 445.8 ng/mL      Magnesium, Serum: 2.2 mg/dL ( @ 06:34)  Phosphorus Level, Serum: 2.9 mg/dL ( @ 06:34)  Phosphorus Level, Serum: 3.2 mg/dL ( @ 23:21)  Magnesium, Serum: 2.3 mg/dL ( @ 23:21)  Magnesium, Serum: 2.3 mg/dL ( @ 17:09)  Phosphorus Level, Serum: 3.5 mg/dL ( @ 17:09)  Magnesium, Serum: 1.8 mg/dL ( @ 09:16)  Phosphorus Level, Serum: 3.5 mg/dL ( @ 09:16)    Total Cholesterol: 200  LDL: 130  HDL: 60  T            ECG:    Echo:  < from: TTE with Doppler (w/Cont) (17 @ 10:58) >   Severe segmental left ventricular systolic dysfunction.  Severe hypokinesis/akinesis of the anteroseptum, anterior  wall., apex, distal inferior/inferolateral wall.  Endocardial visualization enhanced with intravenous    < end of copied text >    Stress Testing:     Cath: :  EZRA X 3 to pLAD. PCWP 31, 100% RCA w collaterals .  CI 2.5    Imaging:    Interpretation of Telemetry:Sinus with 1 run of NSVT      Physical Exam:    Appearance: Well developed   Cardiovascular: S1 S2 no G/M   Procedural Access Site: right groin negative swan/ IABP site   Respiratory:Clear B/L   Gastrointestinal: Soft ND NT  Extremities:(+) distal pulses

## 2017-08-26 NOTE — PROGRESS NOTE ADULT - ASSESSMENT
51 Y/O M with no significant medical history except former smoker, presents with late presentation AWSTEMI with elevated filling pressure and hypotension requiring IABP support and swan diego monitoring

## 2017-08-27 DIAGNOSIS — I42.9 CARDIOMYOPATHY, UNSPECIFIED: ICD-10-CM

## 2017-08-27 LAB
ALBUMIN SERPL ELPH-MCNC: 3.6 G/DL — SIGNIFICANT CHANGE UP (ref 3.3–5)
ALP SERPL-CCNC: 57 U/L — SIGNIFICANT CHANGE UP (ref 40–120)
ALT FLD-CCNC: 56 U/L RC — HIGH (ref 10–45)
ANION GAP SERPL CALC-SCNC: 10 MMOL/L — SIGNIFICANT CHANGE UP (ref 5–17)
AST SERPL-CCNC: 172 U/L — HIGH (ref 10–40)
BASOPHILS # BLD AUTO: 0 K/UL — SIGNIFICANT CHANGE UP (ref 0–0.2)
BASOPHILS NFR BLD AUTO: 0.3 % — SIGNIFICANT CHANGE UP (ref 0–2)
BILIRUB SERPL-MCNC: 0.9 MG/DL — SIGNIFICANT CHANGE UP (ref 0.2–1.2)
BUN SERPL-MCNC: 12 MG/DL — SIGNIFICANT CHANGE UP (ref 7–23)
CALCIUM SERPL-MCNC: 8.3 MG/DL — LOW (ref 8.4–10.5)
CHLORIDE SERPL-SCNC: 104 MMOL/L — SIGNIFICANT CHANGE UP (ref 96–108)
CO2 SERPL-SCNC: 25 MMOL/L — SIGNIFICANT CHANGE UP (ref 22–31)
CREAT SERPL-MCNC: 0.89 MG/DL — SIGNIFICANT CHANGE UP (ref 0.5–1.3)
EOSINOPHIL # BLD AUTO: 0.1 K/UL — SIGNIFICANT CHANGE UP (ref 0–0.5)
EOSINOPHIL NFR BLD AUTO: 0.7 % — SIGNIFICANT CHANGE UP (ref 0–6)
GLUCOSE SERPL-MCNC: 110 MG/DL — HIGH (ref 70–99)
HCT VFR BLD CALC: 40.2 % — SIGNIFICANT CHANGE UP (ref 39–50)
HGB BLD-MCNC: 13.6 G/DL — SIGNIFICANT CHANGE UP (ref 13–17)
LYMPHOCYTES # BLD AUTO: 2.2 K/UL — SIGNIFICANT CHANGE UP (ref 1–3.3)
LYMPHOCYTES # BLD AUTO: 21.1 % — SIGNIFICANT CHANGE UP (ref 13–44)
MAGNESIUM SERPL-MCNC: 2.3 MG/DL — SIGNIFICANT CHANGE UP (ref 1.6–2.6)
MCHC RBC-ENTMCNC: 32.4 PG — SIGNIFICANT CHANGE UP (ref 27–34)
MCHC RBC-ENTMCNC: 33.9 GM/DL — SIGNIFICANT CHANGE UP (ref 32–36)
MCV RBC AUTO: 95.6 FL — SIGNIFICANT CHANGE UP (ref 80–100)
MONOCYTES # BLD AUTO: 1.4 K/UL — HIGH (ref 0–0.9)
MONOCYTES NFR BLD AUTO: 13.2 % — SIGNIFICANT CHANGE UP (ref 2–14)
NEUTROPHILS # BLD AUTO: 6.7 K/UL — SIGNIFICANT CHANGE UP (ref 1.8–7.4)
NEUTROPHILS NFR BLD AUTO: 64.8 % — SIGNIFICANT CHANGE UP (ref 43–77)
PHOSPHATE SERPL-MCNC: 2.4 MG/DL — LOW (ref 2.5–4.5)
PLATELET # BLD AUTO: 170 K/UL — SIGNIFICANT CHANGE UP (ref 150–400)
POTASSIUM SERPL-MCNC: 4.3 MMOL/L — SIGNIFICANT CHANGE UP (ref 3.5–5.3)
POTASSIUM SERPL-SCNC: 4.3 MMOL/L — SIGNIFICANT CHANGE UP (ref 3.5–5.3)
PROT SERPL-MCNC: 6.3 G/DL — SIGNIFICANT CHANGE UP (ref 6–8.3)
RBC # BLD: 4.21 M/UL — SIGNIFICANT CHANGE UP (ref 4.2–5.8)
RBC # FLD: 11.5 % — SIGNIFICANT CHANGE UP (ref 10.3–14.5)
SODIUM SERPL-SCNC: 139 MMOL/L — SIGNIFICANT CHANGE UP (ref 135–145)
WBC # BLD: 10.3 K/UL — SIGNIFICANT CHANGE UP (ref 3.8–10.5)
WBC # FLD AUTO: 10.3 K/UL — SIGNIFICANT CHANGE UP (ref 3.8–10.5)

## 2017-08-27 PROCEDURE — 93306 TTE W/DOPPLER COMPLETE: CPT | Mod: 26

## 2017-08-27 PROCEDURE — 99233 SBSQ HOSP IP/OBS HIGH 50: CPT

## 2017-08-27 RX ORDER — LISINOPRIL 2.5 MG/1
2.5 TABLET ORAL DAILY
Qty: 0 | Refills: 0 | Status: DISCONTINUED | OUTPATIENT
Start: 2017-08-27 | End: 2017-08-28

## 2017-08-27 RX ORDER — ASPIRIN/CALCIUM CARB/MAGNESIUM 324 MG
650 TABLET ORAL ONCE
Qty: 0 | Refills: 0 | Status: COMPLETED | OUTPATIENT
Start: 2017-08-27 | End: 2017-08-27

## 2017-08-27 RX ADMIN — TICAGRELOR 90 MILLIGRAM(S): 90 TABLET ORAL at 17:31

## 2017-08-27 RX ADMIN — Medication 12.5 MILLIGRAM(S): at 06:24

## 2017-08-27 RX ADMIN — Medication 12.5 MILLIGRAM(S): at 17:31

## 2017-08-27 RX ADMIN — TICAGRELOR 90 MILLIGRAM(S): 90 TABLET ORAL at 06:24

## 2017-08-27 RX ADMIN — Medication 650 MILLIGRAM(S): at 11:56

## 2017-08-27 RX ADMIN — ATORVASTATIN CALCIUM 80 MILLIGRAM(S): 80 TABLET, FILM COATED ORAL at 21:03

## 2017-08-27 NOTE — PROGRESS NOTE ADULT - ASSESSMENT
53 Y/O M w late presentation AWSTEMI  Peak CK over 600.. IABP and Right heart cath assist, now D/Francisco.  Severe LV disfunction will need life vest on d/c . Frequent ventricular ectopy now diminished

## 2017-08-27 NOTE — CHART NOTE - NSCHARTNOTEFT_GEN_A_CORE
====================  NEW EVENTS:  ====================  No events O/N    ====================  SUMMARY:  ====================  55 y/o M with no significant medical history, recently stopped smoking, recent negative cardiac workup presents with 3 days of intermittent resting chest pain, s/p IABP assisted PCI 3 EZRA to pLAD, 100% RCA not intervered 2/2 good collaterials. IABP d/c'ed 8/26, groin site stable    ====================  VITALS:  ====================    ICU Vital Signs Last 24 Hrs  T(C): 36.9 (27 Aug 2017 19:00), Max: 37.1 (26 Aug 2017 23:00)  T(F): 98.5 (27 Aug 2017 19:00), Max: 98.7 (26 Aug 2017 23:00)  HR: 58 (27 Aug 2017 21:00) (54 - 76)  BP: 97/71 (27 Aug 2017 21:00) (86/72 - 105/77)  BP(mean): 79 (27 Aug 2017 21:00) (68 - 88)  ABP: --  ABP(mean): --  RR: 17 (27 Aug 2017 21:00) (16 - 19)  SpO2: 97% (27 Aug 2017 08:00) (94% - 97%)      I&O's Summary    26 Aug 2017 07:01  -  27 Aug 2017 07:00  --------------------------------------------------------  IN: 774 mL / OUT: 2050 mL / NET: -1276 mL    27 Aug 2017 07:01  -  27 Aug 2017 22:25  --------------------------------------------------------  IN: 240 mL / OUT: 1103 mL / NET: -863 mL      ====================  LABS:  ====================                          13.6   10.3  )-----------( 170      ( 27 Aug 2017 04:49 )             40.2     08-27    139  |  104  |  12  ----------------------------<  110<H>  4.3   |  25  |  0.89    Ca    8.3<L>      27 Aug 2017 04:49  Phos  2.4     08-27  Mg     2.3     08-27    TPro  6.3  /  Alb  3.6  /  TBili  0.9  /  DBili  x   /  AST  172<H>  /  ALT  56<H>  /  AlkPhos  57  08-27    PT/INR - ( 25 Aug 2017 23:21 )   PT: 11.8 sec;   INR: 1.09 ratio         PTT - ( 26 Aug 2017 06:34 )  PTT:76.5 sec        ====================  PLAN:  ====================  - Lifevest eval  - DAPT, Lipitor, Metorpolol. Starting ACEi in AM

## 2017-08-27 NOTE — PROGRESS NOTE ADULT - SUBJECTIVE AND OBJECTIVE BOX
HPI:  53 Y/o M No significant medical history, stopped smoking past month after cardiac work-up for chest pain which proved negative. Pain at that time was dissimilar from his current  admitting chest  discomfort.   He states 3 days ago he developed mid sternal chest pain with associated diaphoresis while at rest, pain lasted up to 30 minutes.  The following day it reoccurred and  lasted fa little longer.   Last evening it reoccurred with greater intensity and duration.  On presentation to [place] it was noted he had an Anterior wall STEMI.  He was loaded with Clopidogril, Aspirin, Heparin and MSO4 and transferred to [facility] for urgent cardiac catheterization.  Cath showed 100% stenosis in p LAD.  EZRA X3.  RCA was 100% with collaterals .  PCWP 31.  CI 2.54 PA Sat 69% PAP 47/26/35. IABP support and right heart cath was placed.   Lasix 20 mgm IV was given. (25 Aug 2017 07:49)        Overnight Events:difficulty with inspiration .  ASA given.. check CXR  Review Of Systems:    Constitutional: " feel like Im climbing a mountain"   Respiratory: No dyspnea  Cardiovascular: No chest pain    Allergy:  Allergies    No Known Allergies    Intolerances        Medications:  MEDICATIONS  (STANDING):  aspirin enteric coated 81 milliGRAM(s) Oral daily  ticagrelor 90 milliGRAM(s) Oral two times a day  atorvastatin 80 milliGRAM(s) Oral at bedtime  metoprolol 12.5 milliGRAM(s) Oral two times a day  aspirin 650 milliGRAM(s) Oral once    MEDICATIONS  (PRN):  ALPRAZolam 0.25 milliGRAM(s) Oral four times a day PRN anxiety      PMH/PSH/FH/SH: [ ] Unchanged    Vitals:  T(C): 36.7 (17 @ 04:00), Max: 37.7 (17 @ 16:00)  HR: 56 (17 @ 07:00) (54 - 76)  BP: 86/72 (17 @ 07:00) (81/71 - 103/73)  BP(mean): 76 (17 @ 07:00) (68 - 85)  RR: 18 (17 @ 07:00) (15 - 19)  SpO2: 96% (17 @ 02:00) (94% - 97%)  Wt(kg): --  Daily     Daily Weight in k.4 (27 Aug 2017 01:00)  I&O's Summary    26 Aug 2017 07:01  -  27 Aug 2017 07:00  --------------------------------------------------------  IN: 774 mL / OUT: 2050 mL / NET: -1276 mL        Labs:                        13.6   10.3  )-----------( 170      ( 27 Aug 2017 04:49 )             40.2         139  |  104  |  12  ----------------------------<  110<H>  4.3   |  25  |  0.89    Ca    8.3<L>      27 Aug 2017 04:49  Phos  2.4       Mg     2.3         TPro  6.3  /  Alb  3.6  /  TBili  0.9  /  DBili  x   /  AST  172<H>  /  ALT  56<H>  /  AlkPhos  57      PT/INR - ( 25 Aug 2017 23:21 )   PT: 11.8 sec;   INR: 1.09 ratio         PTT - ( 26 Aug 2017 06:34 )  PTT:76.5 sec  CARDIAC MARKERS ( 26 Aug 2017 16:56 )  x     / 8.36 ng/mL / 1830 U/L / x     / 84.6 ng/mL  CARDIAC MARKERS ( 25 Aug 2017 23:21 )  x     / 7.45 ng/mL / 4924 U/L / x     / 465.3 ng/mL  CARDIAC MARKERS ( 25 Aug 2017 17:09 )  x     / 8.04 ng/mL / 6152 U/L / x     / 600.0 ng/mL  CARDIAC MARKERS ( 25 Aug 2017 09:16 )  x     / 1.79 ng/mL / 2811 U/L / x     / 445.8 ng/mL      Magnesium, Serum: 2.3 mg/dL ( @ 04:49)  Phosphorus Level, Serum: 2.4 mg/dL ( @ 04:49)              ECG:    Echo:< from: TTE with Doppler (w/Cont) (17 @ 10:58) >  Severe segmental left ventricular systolic dysfunction.  Severe hypokinesis/akinesis of the anteroseptum, anterior  wall., apex, distal inferior/inferolateral wall.  No thrombusR< end of copied text >      Stress Testing:     Cath:: 100% LAD, EZRA , !))% RCA W Collaterals, OM1 70% ? eventual intervention     Imaging:    Interpretation of Telemetry: Sinus rhythm No arrythmia      Physical Exam:    Appearance: Well developed   Cardiovascular: s1 s2 no G/M .  No JVD   Procedural Access Site:Right groin negative   Respiratory:clear B/L   Gastrointestinal: Soft ND, NT   Extremities:(+) Distal pulses

## 2017-08-28 ENCOUNTER — TRANSCRIPTION ENCOUNTER (OUTPATIENT)
Age: 52
End: 2017-08-28

## 2017-08-28 LAB
ALBUMIN SERPL ELPH-MCNC: 3.7 G/DL — SIGNIFICANT CHANGE UP (ref 3.3–5)
ALP SERPL-CCNC: 58 U/L — SIGNIFICANT CHANGE UP (ref 40–120)
ALT FLD-CCNC: 43 U/L RC — SIGNIFICANT CHANGE UP (ref 10–45)
ANION GAP SERPL CALC-SCNC: 12 MMOL/L — SIGNIFICANT CHANGE UP (ref 5–17)
AST SERPL-CCNC: 79 U/L — HIGH (ref 10–40)
BASOPHILS # BLD AUTO: 0 K/UL — SIGNIFICANT CHANGE UP (ref 0–0.2)
BASOPHILS NFR BLD AUTO: 0.4 % — SIGNIFICANT CHANGE UP (ref 0–2)
BILIRUB SERPL-MCNC: 0.7 MG/DL — SIGNIFICANT CHANGE UP (ref 0.2–1.2)
BUN SERPL-MCNC: 14 MG/DL — SIGNIFICANT CHANGE UP (ref 7–23)
CALCIUM SERPL-MCNC: 8.5 MG/DL — SIGNIFICANT CHANGE UP (ref 8.4–10.5)
CHLORIDE SERPL-SCNC: 103 MMOL/L — SIGNIFICANT CHANGE UP (ref 96–108)
CK MB BLD-MCNC: 2.1 % — SIGNIFICANT CHANGE UP (ref 0–3.5)
CK MB CFR SERPL CALC: 9.6 NG/ML — HIGH (ref 0–6.7)
CK SERPL-CCNC: 450 U/L — HIGH (ref 30–200)
CO2 SERPL-SCNC: 25 MMOL/L — SIGNIFICANT CHANGE UP (ref 22–31)
CREAT SERPL-MCNC: 1.11 MG/DL — SIGNIFICANT CHANGE UP (ref 0.5–1.3)
EOSINOPHIL # BLD AUTO: 0.1 K/UL — SIGNIFICANT CHANGE UP (ref 0–0.5)
EOSINOPHIL NFR BLD AUTO: 1.4 % — SIGNIFICANT CHANGE UP (ref 0–6)
GLUCOSE SERPL-MCNC: 110 MG/DL — HIGH (ref 70–99)
HCT VFR BLD CALC: 41.1 % — SIGNIFICANT CHANGE UP (ref 39–50)
HGB BLD-MCNC: 13.9 G/DL — SIGNIFICANT CHANGE UP (ref 13–17)
LYMPHOCYTES # BLD AUTO: 2.3 K/UL — SIGNIFICANT CHANGE UP (ref 1–3.3)
LYMPHOCYTES # BLD AUTO: 24.1 % — SIGNIFICANT CHANGE UP (ref 13–44)
MAGNESIUM SERPL-MCNC: 2.1 MG/DL — SIGNIFICANT CHANGE UP (ref 1.6–2.6)
MCHC RBC-ENTMCNC: 32.3 PG — SIGNIFICANT CHANGE UP (ref 27–34)
MCHC RBC-ENTMCNC: 33.8 GM/DL — SIGNIFICANT CHANGE UP (ref 32–36)
MCV RBC AUTO: 95.6 FL — SIGNIFICANT CHANGE UP (ref 80–100)
MONOCYTES # BLD AUTO: 1.2 K/UL — HIGH (ref 0–0.9)
MONOCYTES NFR BLD AUTO: 12.6 % — SIGNIFICANT CHANGE UP (ref 2–14)
NEUTROPHILS # BLD AUTO: 5.8 K/UL — SIGNIFICANT CHANGE UP (ref 1.8–7.4)
NEUTROPHILS NFR BLD AUTO: 61.5 % — SIGNIFICANT CHANGE UP (ref 43–77)
PHOSPHATE SERPL-MCNC: 3.2 MG/DL — SIGNIFICANT CHANGE UP (ref 2.5–4.5)
PLATELET # BLD AUTO: 181 K/UL — SIGNIFICANT CHANGE UP (ref 150–400)
POTASSIUM SERPL-MCNC: 4.3 MMOL/L — SIGNIFICANT CHANGE UP (ref 3.5–5.3)
POTASSIUM SERPL-SCNC: 4.3 MMOL/L — SIGNIFICANT CHANGE UP (ref 3.5–5.3)
PROT SERPL-MCNC: 6.6 G/DL — SIGNIFICANT CHANGE UP (ref 6–8.3)
RBC # BLD: 4.3 M/UL — SIGNIFICANT CHANGE UP (ref 4.2–5.8)
RBC # FLD: 11.2 % — SIGNIFICANT CHANGE UP (ref 10.3–14.5)
SODIUM SERPL-SCNC: 140 MMOL/L — SIGNIFICANT CHANGE UP (ref 135–145)
TROPONIN T SERPL-MCNC: 8.78 NG/ML — HIGH (ref 0–0.06)
WBC # BLD: 9.4 K/UL — SIGNIFICANT CHANGE UP (ref 3.8–10.5)
WBC # FLD AUTO: 9.4 K/UL — SIGNIFICANT CHANGE UP (ref 3.8–10.5)

## 2017-08-28 PROCEDURE — 99233 SBSQ HOSP IP/OBS HIGH 50: CPT

## 2017-08-28 PROCEDURE — 93010 ELECTROCARDIOGRAM REPORT: CPT

## 2017-08-28 PROCEDURE — 99222 1ST HOSP IP/OBS MODERATE 55: CPT | Mod: GC

## 2017-08-28 RX ORDER — METOPROLOL TARTRATE 50 MG
12.5 TABLET ORAL
Qty: 0 | Refills: 0 | COMMUNITY
Start: 2017-08-28

## 2017-08-28 RX ORDER — METOPROLOL TARTRATE 50 MG
12.5 TABLET ORAL AT BEDTIME
Qty: 0 | Refills: 0 | Status: DISCONTINUED | OUTPATIENT
Start: 2017-08-28 | End: 2017-08-29

## 2017-08-28 RX ORDER — ASPIRIN/CALCIUM CARB/MAGNESIUM 324 MG
1 TABLET ORAL
Qty: 0 | Refills: 0 | COMMUNITY
Start: 2017-08-28

## 2017-08-28 RX ORDER — METOPROLOL TARTRATE 50 MG
12.5 TABLET ORAL AT BEDTIME
Qty: 0 | Refills: 0 | Status: DISCONTINUED | OUTPATIENT
Start: 2017-08-28 | End: 2017-08-28

## 2017-08-28 RX ORDER — TICAGRELOR 90 MG/1
1 TABLET ORAL
Qty: 0 | Refills: 0 | COMMUNITY
Start: 2017-08-28

## 2017-08-28 RX ORDER — METOPROLOL TARTRATE 50 MG
0.5 TABLET ORAL
Qty: 30 | Refills: 0 | OUTPATIENT
Start: 2017-08-28 | End: 2017-09-27

## 2017-08-28 RX ORDER — ALPRAZOLAM 0.25 MG
1 TABLET ORAL
Qty: 0 | Refills: 0 | COMMUNITY
Start: 2017-08-28

## 2017-08-28 RX ORDER — ATORVASTATIN CALCIUM 80 MG/1
1 TABLET, FILM COATED ORAL
Qty: 0 | Refills: 0 | COMMUNITY
Start: 2017-08-28

## 2017-08-28 RX ORDER — TICAGRELOR 90 MG/1
1 TABLET ORAL
Qty: 60 | Refills: 0
Start: 2017-08-28 | End: 2017-09-27

## 2017-08-28 RX ORDER — METOPROLOL TARTRATE 50 MG
12.5 TABLET ORAL DAILY
Qty: 0 | Refills: 0 | Status: DISCONTINUED | OUTPATIENT
Start: 2017-08-28 | End: 2017-08-28

## 2017-08-28 RX ADMIN — TICAGRELOR 90 MILLIGRAM(S): 90 TABLET ORAL at 18:04

## 2017-08-28 RX ADMIN — ATORVASTATIN CALCIUM 80 MILLIGRAM(S): 80 TABLET, FILM COATED ORAL at 21:27

## 2017-08-28 RX ADMIN — TICAGRELOR 90 MILLIGRAM(S): 90 TABLET ORAL at 05:07

## 2017-08-28 RX ADMIN — Medication 12.5 MILLIGRAM(S): at 21:27

## 2017-08-28 RX ADMIN — Medication 81 MILLIGRAM(S): at 11:13

## 2017-08-28 RX ADMIN — Medication 12.5 MILLIGRAM(S): at 07:04

## 2017-08-28 NOTE — PROGRESS NOTE ADULT - ASSESSMENT
53 Y/O M w late presentation AWSTEMI  Peak CK over 600.. IABP and Right heart cath assist, now D/Francisco.  Severe LV disfunction will need life vest on d/c . Frequent ventricular ectopy now diminished 51 Y/O M w late presentation AWSTEMI  Peak CK over 600. IABP and Right heart cath assist, now D/Francisco.  Severe LV disfunction will need life vest on d/c . Frequent ventricular ectopy now diminished

## 2017-08-28 NOTE — DISCHARGE NOTE ADULT - CARE PROVIDERS DIRECT ADDRESSES
,[email or web address]

## 2017-08-28 NOTE — DISCHARGE NOTE ADULT - HOSPITAL COURSE
51 Y/o M No significant medical history, stopped smoking past month after cardiac work-up for chest pain which proved negative. Pain at that time was dissimilar from his current  admitting chest  discomfort.   He states 3 days ago he developed mid sternal chest pain with associated diaphoresis while at rest, pain lasted up to 30 minutes.  The following day it reoccurred and  lasted fa little longer.   Last evening it reoccurred with greater intensity and duration.  On presentation to [facility] it was noted he had an Anterior wall STEMI.  He was loaded with Clopidogril, Aspirin, Heparin and MSO4 and transferred to [facility] for urgent cardiac catheterization.  Cath showed 100% stenosis in p LAD.  EZRA X3.  RCA was 100% with collaterals .  PCWP 31.  CI 2.54 PA Sat 69% PAP 47/26/35. IABP support and right heart cath was placed.   Lasix 20 mgm IV was given.     Patient was relatively hypotensive in the CCU with MAP 60-65 with IABP. The IABP was successfully removed and patient tolerated low dose lopressor. Repeat TTE showed EF slightly improved from ~20 to 33%. Patient evaluated by EP for a life Vest and the decision was made to discharge patient with 53 Y/o M No significant medical history, stopped smoking past month after cardiac work-up for chest pain which proved negative. Pain at that time was dissimilar from his current  admitting chest  discomfort.   He states 3 days ago he developed mid sternal chest pain with associated diaphoresis while at rest, pain lasted up to 30 minutes.  The following day it reoccurred and  lasted fa little longer.   Last evening it reoccurred with greater intensity and duration.  On presentation to [facility] it was noted he had an Anterior wall STEMI.  He was loaded with Clopidogril, Aspirin, Heparin and MSO4 and transferred to [facility] for urgent cardiac catheterization.  Cath showed 100% stenosis in p LAD.  EZRA X3.  RCA was 100% with collaterals .  PCWP 31.  CI 2.54 PA Sat 69% PAP 47/26/35. IABP support and right heart cath was placed.   Lasix 20 mgm IV was given.     Patient was relatively hypotensive in the CCU with MAP 60-65 with IABP. The IABP was successfully removed and patient tolerated low dose lopressor. Repeat TTE showed EF slightly improved from ~20 to 33%. Patient evaluated by EP for a life Vest and the decision was made to discharge patient with outpatient follow up given improvement in EF. Patient offered cardiac rehab which he accepted. representatives from rehab to contact patient within a week of discharge. 53 Y/o M No significant medical history, stopped smoking past month after cardiac work-up for chest pain which proved negative. Pain at that time was dissimilar from his current  admitting chest  discomfort.   He states 3 days ago he developed mid sternal chest pain with associated diaphoresis while at rest, pain lasted up to 30 minutes.  The following day it reoccurred and  lasted fa little longer.   Last evening it reoccurred with greater intensity and duration.  On presentation to [facility] it was noted he had an Anterior wall STEMI.  He was loaded with Clopidogril, Aspirin, Heparin and MSO4 and transferred to [facility] for urgent cardiac catheterization.  Cath showed 100% stenosis in p LAD.  EZRA X3.  RCA was 100% with collaterals .  PCWP 31.  CI 2.54 PA Sat 69% PAP 47/26/35. IABP support and right heart cath was placed.   Lasix 20 mgm IV was given.     Patient was relatively hypotensive in the CCU with MAP 60-65 with IABP. The IABP was successfully removed and patient tolerated low dose lopressor. Repeat TTE showed EF slightly improved from ~20 to 33%. Patient evaluated by EP for a life Vest and the decision was made to discharge patient with outpatient follow up given improvement in EF. Patient offered cardiac rehab which he accepted. Representatives from rehab to contact patient within a week of discharge. You need to make an appointment with Dr. German and be seen by cardiology within a week of discharge. You also have an appointment with EP Dr. Hernandez on 09/29 at 9 am.

## 2017-08-28 NOTE — DISCHARGE NOTE ADULT - MEDICATION SUMMARY - MEDICATIONS TO STOP TAKING
I will STOP taking the medications listed below when I get home from the hospital:  None I will STOP taking the medications listed below when I get home from the hospital:    Zantac 150 oral tablet  -- 1 milligram(s) by mouth

## 2017-08-28 NOTE — CONSULT NOTE ADULT - ASSESSMENT
52 year old male with a late presentation AW-STEMI s/p 3 EZRA to the LAD and now with resultant severe LV disfunction on TTE. He is now feeling better, but difficulty starting HFrEF medications due to low blood pressure. Upon conversation, he is amenable to wearing a life vest.    # Anterior wall ST elevation myocardial infarction.    - DAPT, Statin, BB per primary team  - TTE, 48 hours post MI, no LV thrombus and while EF did improve somewhat, it is still depressed at 33% by Martins biplane  - Given improvement in LV EF, consider out patient reevaluation of  - Patient is amenable to wearing a Life Vest, which is appropriate given revascularized AMI with a resultant decreased LV EF    Clifton Edwards  [facility] - EP service  [id number]

## 2017-08-28 NOTE — DISCHARGE NOTE ADULT - CARE PROVIDER_API CALL
Anastasiya Barksdale), Cardiovascular Disease; Interventional Cardiology  [address] [place] [address]  Phone: [phone]  Fax: [phone] Henna Hernandez), Cardiac Electrophysiology; Cardiovascular Disease  [address] [place], [place] [id number]  Phone: [phone]  Fax: [phone]    Brittany German (MD), Cardiovascular Disease; Interventional Cardiology  [address] [place] [address]  Phone: [phone]  Fax: [phone]

## 2017-08-28 NOTE — DISCHARGE NOTE ADULT - MEDICATION SUMMARY - MEDICATIONS TO TAKE
I will START or STAY ON the medications listed below when I get home from the hospital:    aspirin 81 mg oral delayed release tablet  -- 1 tab(s) by mouth once a day  -- Indication: For CaD    atorvastatin 80 mg oral tablet  -- 1 tab(s) by mouth once a day (at bedtime)  -- Indication: For CaD    ticagrelor 90 mg oral tablet  -- 1 tab(s) by mouth 2 times a day  -- Indication: For CaD    ALPRAZolam 0.25 mg oral tablet  -- 1 tab(s) by mouth 4 times a day, As needed, anxiety  -- Indication: For Anxiety    metoprolol  -- 12.5 milligram(s) by mouth 2 times a day  -- Indication: For CaD    Zantac 150 oral tablet  -- 1 milligram(s) by mouth   -- Indication: For GERD I will START or STAY ON the medications listed below when I get home from the hospital:    aspirin 81 mg oral delayed release tablet  -- 1 tab(s) by mouth once a day  -- Indication: For CaD    atorvastatin 80 mg oral tablet  -- 1 tab(s) by mouth once a day (at bedtime)  -- Indication: For CaD    ticagrelor 90 mg oral tablet  -- 1 tab(s) by mouth 2 times a day  -- Indication: For CaD    metoprolol tartrate 25 mg oral tablet  -- 0.5 tab(s) by mouth 2 times a day  -- It is very important that you take or use this exactly as directed.  Do not skip doses or discontinue unless directed by your doctor.  May cause drowsiness.  Alcohol may intensify this effect.  Use care when operating dangerous machinery.  Some non-prescription drugs may aggravate your condition.  Read all labels carefully.  If a warning appears, check with your doctor before taking.  Take with food or milk.  This drug may impair the ability to drive or operate machinery.  Use care until you become familiar with its effects.    -- Indication: For CaD    Zantac 150 oral tablet  -- 1 milligram(s) by mouth   -- Indication: For GERD I will START or STAY ON the medications listed below when I get home from the hospital:    aspirin 81 mg oral delayed release tablet  -- 1 tab(s) by mouth once a day  -- Indication: For CaD    atorvastatin 80 mg oral tablet  -- 1 tab(s) by mouth once a day (at bedtime)  -- Indication: For CaD    ticagrelor 90 mg oral tablet  -- 1 tab(s) by mouth 2 times a day  -- Indication: For CaD    metoprolol succinate 25 mg oral tablet, extended release  -- 0.5 tab(s) by mouth once a day  -- It is very important that you take or use this exactly as directed.  Do not skip doses or discontinue unless directed by your doctor.  May cause drowsiness.  Alcohol may intensify this effect.  Use care when operating dangerous machinery.  Some non-prescription drugs may aggravate your condition.  Read all labels carefully.  If a warning appears, check with your doctor before taking.  Swallow whole.  Do not crush.  Take with food or milk.  This drug may impair the ability to drive or operate machinery.  Use care until you become familiar with its effects.    -- Indication: For CaD    Zantac 150 oral tablet  -- 1 milligram(s) by mouth   -- Indication: For GERD I will START or STAY ON the medications listed below when I get home from the hospital:    aspirin 81 mg oral delayed release tablet  -- 1 tab(s) by mouth once a day  -- Indication: For CaD    atorvastatin 80 mg oral tablet  -- 1 tab(s) by mouth once a day (at bedtime)  -- Indication: For CaD    ticagrelor 90 mg oral tablet  -- 1 tab(s) by mouth 2 times a day  -- Indication: For CaD    metoprolol succinate 25 mg oral tablet, extended release  -- 0.5 tab(s) by mouth once a day  -- It is very important that you take or use this exactly as directed.  Do not skip doses or discontinue unless directed by your doctor.  May cause drowsiness.  Alcohol may intensify this effect.  Use care when operating dangerous machinery.  Some non-prescription drugs may aggravate your condition.  Read all labels carefully.  If a warning appears, check with your doctor before taking.  Swallow whole.  Do not crush.  Take with food or milk.  This drug may impair the ability to drive or operate machinery.  Use care until you become familiar with its effects.    -- Indication: For CaD

## 2017-08-28 NOTE — PROGRESS NOTE ADULT - PROBLEM SELECTOR PLAN 1
DAPT, Statin, BB off ACE I due to B/P   Will need Life vest eval DAPT, Statin, BB -->off ACE I due to B/P   Life Vest Evaluation today   TTE yesterday--48 hours post MI--no LV thrombus   EF--33%--sev LV sys dysfunction

## 2017-08-28 NOTE — DISCHARGE NOTE ADULT - PLAN OF CARE
Medical Optimization Revascularize Optimal Medical Therapy Please take your medications as prescribed and report any changes in your symptoms to your PCP. You arrived with Acute STEMI and were revascularized. Please take your medications as prescribed and report any changes in your symptoms to your PCP. You received stents are being discharged on antiplatelet medications. Please take your medications as prescribed and report any changes in your symptoms to your PCP. You arrived with Acute STEMI and were revascularized with stents. You were also offered cardiac rehab which you accepted. You will be contacted representatives from Cardiac Rehab within a week of discharge. Please take your medications as prescribed and report any changes in your symptoms to your PCP. You arrived with Acute STEMI and were revascularized with stents. You were also offered cardiac rehab which you accepted. You will be contacted representatives from Cardiac Rehab within a week of discharge. You need to make an appointment with Dr. German Please take your medications as prescribed and report any changes in your symptoms to your PCP. You arrived with Acute STEMI and were revascularized with stents. You were also offered cardiac rehab which you accepted. You will be contacted representatives from Cardiac Rehab within a week of discharge. You need to make an appointment with Dr. German. You have an appointment with EP Dr. Hernandez on 09/29 at 9 am. Please take your medications as prescribed and report any changes in your symptoms to your PCP.

## 2017-08-28 NOTE — PROGRESS NOTE ADULT - SUBJECTIVE AND OBJECTIVE BOX
Chief Complaint: Chest Discomfort     HPI:  51 Y/o M No significant medical history, stopped smoking past month after cardiac work-up for chest pain which proved negative. Pain at that time was dissimilar from his current  admitting chest  discomfort.   He states 3 days ago he developed mid sternal chest pain with associated diaphoresis while at rest, pain lasted up to 30 minutes.  The following day it reoccurred and  lasted fa little longer.   Last evening it reoccurred with greater intensity and duration.  On presentation to [place] it was noted he had an Anterior wall STEMI.  He was loaded with Clopidogril, Aspirin, Heparin and MSO4 and transferred to [facility] for urgent cardiac catheterization.  Cath showed 100% stenosis in p LAD.  EZRA X3.  RCA was 100% with collaterals .  PCWP 31.  CI 2.54 PA Sat 69% PAP 47/26/35. IABP support and right heart cath was placed.   Lasix 20 mgm IV was given. (25 Aug 2017 07:49)    Overnight Events: No acute events overnight    Review of Systems: GEN: no fevers, chills, no night sweats no weight loss , no swollen lymph nodes    EYES: No blurry vision , no changes in vision  ENT: no sores, no runny nose, no sore throat   PULM: no cough, no shortness of breath   CARD: no chest pain, no palpitations    GI : no abdominal pain , no nausea, no vomiting  : no burning urination, no change in color of urine, no flank pain, no blood in urine   MSK: no swelling   SKIN: no bruising or bleeding, no sores in mouth  , no yellowing of the skin  Neuro: no lightheadedness, no headaches, no weakness, no fainting, no confusion , no seizures      Allergy:  Allergies    No Known Allergies    Intolerances      MEDICATIONS  (STANDING):  aspirin enteric coated 81 milliGRAM(s) Oral daily  ticagrelor 90 milliGRAM(s) Oral two times a day  atorvastatin 80 milliGRAM(s) Oral at bedtime  metoprolol 12.5 milliGRAM(s) Oral two times a day    MEDICATIONS  (PRN):  ALPRAZolam 0.25 milliGRAM(s) Oral four times a day PRN anxiety        PMH/PSH/FH/SH: [ ] Unchanged    Vitals:  ICU Vital Signs Last 24 Hrs  T(C): 36.7 (28 Aug 2017 07:00), Max: 36.9 (27 Aug 2017 19:00)  T(F): 98.1 (28 Aug 2017 07:00), Max: 98.5 (27 Aug 2017 19:00)  HR: 64 (28 Aug 2017 07:00) (52 - 76)  BP: 94/59 (28 Aug 2017 07:00) (83/57 - 105/77)  BP(mean): 69 (28 Aug 2017 07:00) (65 - 88)  ABP: --  ABP(mean): --  RR: 16 (28 Aug 2017 07:00) (15 - 18)  SpO2: 97% (27 Aug 2017 08:00) (97% - 97%)      Labs:                                   13.9   9.4   )-----------( 181      ( 28 Aug 2017 05:41 )             41.1     08-28    140  |  103  |  14  ----------------------------<  110<H>  4.3   |  25  |  1.11    Ca    8.5      28 Aug 2017 05:41  Phos  3.2     08-28  Mg     2.1     08-28    TPro  6.6  /  Alb  3.7  /  TBili  0.7  /  DBili  x   /  AST  79<H>  /  ALT  43  /  AlkPhos  58  08-28    Magnesium, Serum: 2.3 mg/dL (08-27 @ 04:49)  Phosphorus Level, Serum: 2.4 mg/dL (08-27 @ 04:49)              ECG:    Echo:< from: TTE with Doppler (w/Cont) (08.25.17 @ 10:58) >  Severe segmental left ventricular systolic dysfunction.  Severe hypokinesis/akinesis of the anteroseptum, anterior  wall., apex, distal inferior/inferolateral wall.  No thrombusR< end of copied text >      Stress Testing:     Cath:8/26: 100% LAD, EZRA , !))% RCA W Collaterals, OM1 70% ? eventual intervention     Imaging:    Interpretation of Telemetry: Sinus rhythm No arrythmia Chief Complaint: Chest Discomfort     HPI:  51 Y/o M No significant medical history, stopped smoking past month after cardiac work-up for chest pain which proved negative. Pain at that time was dissimilar from his current  admitting chest  discomfort.   He states 3 days ago he developed mid sternal chest pain with associated diaphoresis while at rest, pain lasted up to 30 minutes.  The following day it reoccurred and  lasted fa little longer.   Last evening it reoccurred with greater intensity and duration.  On presentation to [facility] it was noted he had an Anterior wall STEMI.  He was loaded with Clopidogril, Aspirin, Heparin and MSO4 and transferred to [facility] for urgent cardiac catheterization.  Cath showed 100% stenosis in p LAD.  EZRA X3.  RCA was 100% with collaterals .  PCWP 31.  CI 2.54 PA Sat 69% PAP 47/26/35. IABP support and right heart cath was placed.   Lasix 20 mgm IV was given. (25 Aug 2017 07:49)    Overnight Events: No acute events overnight. No CP, SOB     Review of Systems: GEN: no fevers, chills, no night sweats no weight loss , no swollen lymph nodes    EYES: No blurry vision , no changes in vision  ENT: no sores, no runny nose, no sore throat   PULM: no cough, no shortness of breath   CARD: no chest pain, no palpitations    GI : no abdominal pain , no nausea, no vomiting  : no burning urination, no change in color of urine, no flank pain, no blood in urine   MSK: no swelling   SKIN: no bruising or bleeding, no sores in mouth  , no yellowing of the skin  Neuro: no lightheadedness, no headaches, no weakness, no fainting, no confusion , no seizures      Allergies    No Known Allergies        MEDICATIONS  (STANDING):  aspirin enteric coated 81 milliGRAM(s) Oral daily  ticagrelor 90 milliGRAM(s) Oral two times a day  atorvastatin 80 milliGRAM(s) Oral at bedtime  metoprolol 12.5 milliGRAM(s) Oral two times a day    MEDICATIONS  (PRN):  ALPRAZolam 0.25 milliGRAM(s) Oral four times a day PRN anxiety        PMH/PSH/FH/SH: [ ] Unchanged    Vitals:  ICU Vital Signs Last 24 Hrs  T(C): 36.7 (28 Aug 2017 07:00), Max: 36.9 (27 Aug 2017 19:00)  T(F): 98.1 (28 Aug 2017 07:00), Max: 98.5 (27 Aug 2017 19:00)  HR: 64 (28 Aug 2017 07:00) (52 - 76)  BP: 94/59 (28 Aug 2017 07:00) (83/57 - 105/77)  BP(mean): 69 (28 Aug 2017 07:00) (65 - 88)  ABP: --  ABP(mean): --  RR: 16 (28 Aug 2017 07:00) (15 - 18)  SpO2: 97% (27 Aug 2017 08:00) (97% - 97%)    PHYSICAL EXAM:  GENERAL: NAD, well-developed  HEAD:  Atraumatic, Normocephalic  EYES: EOMI, PERRLA, conjunctiva and sclera clear  NECK: Supple, No JVD  CHEST/LUNG: Clear to auscultation bilaterally; No wheeze  HEART: Regular rate and rhythm; No murmurs, rubs, or gallops  ABDOMEN: Soft, Nontender, Nondistended; Bowel sounds present  EXTREMITIES:  2+ Peripheral Pulses, No clubbing, cyanosis, or edema  PSYCH: AAOx3  NEUROLOGY: non-focal  SKIN: No rashes or lesions    Labs:                                   13.9   9.4   )-----------( 181      ( 28 Aug 2017 05:41 )             41.1     08-28    140  |  103  |  14  ----------------------------<  110<H>  4.3   |  25  |  1.11    Ca    8.5      28 Aug 2017 05:41  Phos  3.2     08-28  Mg     2.1     08-28    TPro  6.6  /  Alb  3.7  /  TBili  0.7  /  DBili  x   /  AST  79<H>  /  ALT  43  /  AlkPhos  58  08-28    Magnesium, Serum: 2.3 mg/dL (08-27 @ 04:49)  Phosphorus Level, Serum: 2.4 mg/dL (08-27 @ 04:49)              ECG: IRIS v2-3--2-3 mm--improved from before     Echo:< from: TTE with Doppler (w/Cont) (08.25.17 @ 10:58) >  Severe segmental left ventricular systolic dysfunction.  Severe hypokinesis/akinesis of the anteroseptum, anterior  wall., apex, distal inferior/inferolateral wall.  No thrombusR< end of copied text >      Stress Testing:     Cath:8/26: 100% LAD, EZRA , !))% RCA W Collaterals, OM1 70% ? eventual intervention     Imaging:    Interpretation of Telemetry: Sinus rhythm No arrythmia

## 2017-08-28 NOTE — DISCHARGE NOTE ADULT - PATIENT PORTAL LINK FT
“You can access the [employer] Patient Portal, offered by [facility], by registering with the following website: [email or web address]”

## 2017-08-28 NOTE — CHART NOTE - NSCHARTNOTEFT_GEN_A_CORE
====================  NEW EVENTS:  ====================  No events O/N    ====================  SUMMARY:  ====================  55 y/o M with no significant medical history, recently stopped smoking, recent negative cardiac workup presents with 3 days of intermittent resting chest pain, s/p IABP assisted PCI 3 EZRA to pLAD, 100% RCA not intervened 2/2 good collaterals. IABP d/c'ed 8/26, groin site stable    ====================  VITALS:  ====================    ICU Vital Signs Last 24 Hrs  T(C): 36.9 (27 Aug 2017 19:00), Max: 37.1 (26 Aug 2017 23:00)  T(F): 98.5 (27 Aug 2017 19:00), Max: 98.7 (26 Aug 2017 23:00)  HR: 58 (27 Aug 2017 21:00) (54 - 76)  BP: 97/71 (27 Aug 2017 21:00) (86/72 - 105/77)  BP(mean): 79 (27 Aug 2017 21:00) (68 - 88)  ABP: --  ABP(mean): --  RR: 17 (27 Aug 2017 21:00) (16 - 19)  SpO2: 97% (27 Aug 2017 08:00) (94% - 97%)      I&O's Summary    26 Aug 2017 07:01  -  27 Aug 2017 07:00  --------------------------------------------------------  IN: 774 mL / OUT: 2050 mL / NET: -1276 mL    27 Aug 2017 07:01  -  27 Aug 2017 22:25  --------------------------------------------------------  IN: 240 mL / OUT: 1103 mL / NET: -863 mL      ====================  LABS:  ====================                          13.6   10.3  )-----------( 170      ( 27 Aug 2017 04:49 )             40.2     08-27    139  |  104  |  12  ----------------------------<  110<H>  4.3   |  25  |  0.89    Ca    8.3<L>      27 Aug 2017 04:49  Phos  2.4     08-27  Mg     2.3     08-27    TPro  6.3  /  Alb  3.6  /  TBili  0.9  /  DBili  x   /  AST  172<H>  /  ALT  56<H>  /  AlkPhos  57  08-27    PT/INR - ( 25 Aug 2017 23:21 )   PT: 11.8 sec;   INR: 1.09 ratio         PTT - ( 26 Aug 2017 06:34 )  PTT:76.5 sec        ====================  PLAN:  ====================  - Lifevest eval in am  - DAPT, Lipitor, Metorpolol. Metoprolol changed today to XL as patient had dizziness, Orthostatic Negative    Leno Olson MD  Pager: [phone]

## 2017-08-28 NOTE — PROGRESS NOTE ADULT - PROBLEM SELECTOR PLAN 3
Off ACE I 2/2 decreased B/P.  Will need Life vest Systolic Ischemic Cardiomyopathy Off ACE-I 2/2 decreased B/P.  Will need Life vest evaluation. Will have EP follow up today

## 2017-08-28 NOTE — DISCHARGE NOTE ADULT - CARE PLAN
Principal Discharge DX:	Acute heart failure, unspecified heart failure type  Goal:	Medical Optimization  Instructions for follow-up, activity and diet:	Please take your medications as prescribed and report any changes in your symptoms to your PCP.  Secondary Diagnosis:	Anterior wall myocardial infarction  Goal:	Revascularize  Instructions for follow-up, activity and diet:	Please take your medications as prescribed and report any changes in your symptoms to your PCP.  Secondary Diagnosis:	Cardiomyopathy  Goal:	Optimal Medical Therapy  Instructions for follow-up, activity and diet:	Please take your medications as prescribed and report any changes in your symptoms to your PCP. Principal Discharge DX:	Acute heart failure, unspecified heart failure type  Goal:	Medical Optimization  Instructions for follow-up, activity and diet:	You arrived with Acute STEMI and were revascularized. Please take your medications as prescribed and report any changes in your symptoms to your PCP.  Secondary Diagnosis:	Anterior wall myocardial infarction  Goal:	Revascularize  Instructions for follow-up, activity and diet:	Please take your medications as prescribed and report any changes in your symptoms to your PCP.  Secondary Diagnosis:	Cardiomyopathy  Goal:	Optimal Medical Therapy  Instructions for follow-up, activity and diet:	Please take your medications as prescribed and report any changes in your symptoms to your PCP. Principal Discharge DX:	Acute heart failure, unspecified heart failure type  Goal:	Medical Optimization  Instructions for follow-up, activity and diet:	You arrived with Acute STEMI and were revascularized with stents. You were also offered cardiac rehab which you accepted. You will be contacted representatives from Cardiac Rehab within a week of discharge. Please take your medications as prescribed and report any changes in your symptoms to your PCP.  Secondary Diagnosis:	Anterior wall myocardial infarction  Goal:	Revascularize  Instructions for follow-up, activity and diet:	You received stents are being discharged on antiplatelet medications. Please take your medications as prescribed and report any changes in your symptoms to your PCP.  Secondary Diagnosis:	Cardiomyopathy  Goal:	Optimal Medical Therapy  Instructions for follow-up, activity and diet:	Please take your medications as prescribed and report any changes in your symptoms to your PCP. Principal Discharge DX:	Acute heart failure, unspecified heart failure type  Goal:	Medical Optimization  Instructions for follow-up, activity and diet:	You arrived with Acute STEMI and were revascularized with stents. You were also offered cardiac rehab which you accepted. You will be contacted representatives from Cardiac Rehab within a week of discharge. You need to make an appointment with Dr. German Please take your medications as prescribed and report any changes in your symptoms to your PCP.  Secondary Diagnosis:	Anterior wall myocardial infarction  Goal:	Revascularize  Instructions for follow-up, activity and diet:	You received stents are being discharged on antiplatelet medications. Please take your medications as prescribed and report any changes in your symptoms to your PCP.  Secondary Diagnosis:	Cardiomyopathy  Goal:	Optimal Medical Therapy  Instructions for follow-up, activity and diet:	Please take your medications as prescribed and report any changes in your symptoms to your PCP. Principal Discharge DX:	Acute heart failure, unspecified heart failure type  Goal:	Medical Optimization  Instructions for follow-up, activity and diet:	You arrived with Acute STEMI and were revascularized with stents. You were also offered cardiac rehab which you accepted. You will be contacted representatives from Cardiac Rehab within a week of discharge. You need to make an appointment with Dr. German. You have an appointment with DAMIAN Hernandez on 09/29 at 9 am. Please take your medications as prescribed and report any changes in your symptoms to your PCP.  Secondary Diagnosis:	Anterior wall myocardial infarction  Goal:	Revascularize  Instructions for follow-up, activity and diet:	You received stents are being discharged on antiplatelet medications. Please take your medications as prescribed and report any changes in your symptoms to your PCP.  Secondary Diagnosis:	Cardiomyopathy  Goal:	Optimal Medical Therapy  Instructions for follow-up, activity and diet:	Please take your medications as prescribed and report any changes in your symptoms to your PCP.

## 2017-08-29 VITALS — DIASTOLIC BLOOD PRESSURE: 76 MMHG | SYSTOLIC BLOOD PRESSURE: 104 MMHG | HEART RATE: 70 BPM | RESPIRATION RATE: 16 BRPM

## 2017-08-29 PROBLEM — Z00.00 ENCOUNTER FOR PREVENTIVE HEALTH EXAMINATION: Status: ACTIVE | Noted: 2017-08-29

## 2017-08-29 LAB
ALBUMIN SERPL ELPH-MCNC: 3.8 G/DL — SIGNIFICANT CHANGE UP (ref 3.3–5)
ALP SERPL-CCNC: 64 U/L — SIGNIFICANT CHANGE UP (ref 40–120)
ALT FLD-CCNC: 36 U/L RC — SIGNIFICANT CHANGE UP (ref 10–45)
ANION GAP SERPL CALC-SCNC: 13 MMOL/L — SIGNIFICANT CHANGE UP (ref 5–17)
AST SERPL-CCNC: 45 U/L — HIGH (ref 10–40)
BASOPHILS # BLD AUTO: 0 K/UL — SIGNIFICANT CHANGE UP (ref 0–0.2)
BASOPHILS NFR BLD AUTO: 0.4 % — SIGNIFICANT CHANGE UP (ref 0–2)
BILIRUB SERPL-MCNC: 0.8 MG/DL — SIGNIFICANT CHANGE UP (ref 0.2–1.2)
BUN SERPL-MCNC: 14 MG/DL — SIGNIFICANT CHANGE UP (ref 7–23)
CALCIUM SERPL-MCNC: 8.9 MG/DL — SIGNIFICANT CHANGE UP (ref 8.4–10.5)
CHLORIDE SERPL-SCNC: 101 MMOL/L — SIGNIFICANT CHANGE UP (ref 96–108)
CK MB BLD-MCNC: 2.2 % — SIGNIFICANT CHANGE UP (ref 0–3.5)
CK MB CFR SERPL CALC: 6.3 NG/ML — SIGNIFICANT CHANGE UP (ref 0–6.7)
CK SERPL-CCNC: 284 U/L — HIGH (ref 30–200)
CO2 SERPL-SCNC: 24 MMOL/L — SIGNIFICANT CHANGE UP (ref 22–31)
CREAT SERPL-MCNC: 0.9 MG/DL — SIGNIFICANT CHANGE UP (ref 0.5–1.3)
EOSINOPHIL # BLD AUTO: 0.1 K/UL — SIGNIFICANT CHANGE UP (ref 0–0.5)
EOSINOPHIL NFR BLD AUTO: 1.7 % — SIGNIFICANT CHANGE UP (ref 0–6)
GLUCOSE SERPL-MCNC: 116 MG/DL — HIGH (ref 70–99)
HCT VFR BLD CALC: 40.2 % — SIGNIFICANT CHANGE UP (ref 39–50)
HGB BLD-MCNC: 14 G/DL — SIGNIFICANT CHANGE UP (ref 13–17)
LYMPHOCYTES # BLD AUTO: 2.1 K/UL — SIGNIFICANT CHANGE UP (ref 1–3.3)
LYMPHOCYTES # BLD AUTO: 24.2 % — SIGNIFICANT CHANGE UP (ref 13–44)
MAGNESIUM SERPL-MCNC: 2.1 MG/DL — SIGNIFICANT CHANGE UP (ref 1.6–2.6)
MCHC RBC-ENTMCNC: 33.2 PG — SIGNIFICANT CHANGE UP (ref 27–34)
MCHC RBC-ENTMCNC: 34.9 GM/DL — SIGNIFICANT CHANGE UP (ref 32–36)
MCV RBC AUTO: 95 FL — SIGNIFICANT CHANGE UP (ref 80–100)
MONOCYTES # BLD AUTO: 1.1 K/UL — HIGH (ref 0–0.9)
MONOCYTES NFR BLD AUTO: 12.3 % — SIGNIFICANT CHANGE UP (ref 2–14)
NEUTROPHILS # BLD AUTO: 5.4 K/UL — SIGNIFICANT CHANGE UP (ref 1.8–7.4)
NEUTROPHILS NFR BLD AUTO: 61.5 % — SIGNIFICANT CHANGE UP (ref 43–77)
PHOSPHATE SERPL-MCNC: 3.9 MG/DL — SIGNIFICANT CHANGE UP (ref 2.5–4.5)
PLATELET # BLD AUTO: 220 K/UL — SIGNIFICANT CHANGE UP (ref 150–400)
POTASSIUM SERPL-MCNC: 4.2 MMOL/L — SIGNIFICANT CHANGE UP (ref 3.5–5.3)
POTASSIUM SERPL-SCNC: 4.2 MMOL/L — SIGNIFICANT CHANGE UP (ref 3.5–5.3)
PROT SERPL-MCNC: 6.7 G/DL — SIGNIFICANT CHANGE UP (ref 6–8.3)
RBC # BLD: 4.23 M/UL — SIGNIFICANT CHANGE UP (ref 4.2–5.8)
RBC # FLD: 11.3 % — SIGNIFICANT CHANGE UP (ref 10.3–14.5)
SODIUM SERPL-SCNC: 138 MMOL/L — SIGNIFICANT CHANGE UP (ref 135–145)
TROPONIN T SERPL-MCNC: 5.84 NG/ML — HIGH (ref 0–0.06)
WBC # BLD: 8.7 K/UL — SIGNIFICANT CHANGE UP (ref 3.8–10.5)
WBC # FLD AUTO: 8.7 K/UL — SIGNIFICANT CHANGE UP (ref 3.8–10.5)

## 2017-08-29 PROCEDURE — C8929: CPT

## 2017-08-29 PROCEDURE — C9606: CPT | Mod: LD

## 2017-08-29 PROCEDURE — 82803 BLOOD GASES ANY COMBINATION: CPT

## 2017-08-29 PROCEDURE — 80053 COMPREHEN METABOLIC PANEL: CPT

## 2017-08-29 PROCEDURE — C1753: CPT

## 2017-08-29 PROCEDURE — 71045 X-RAY EXAM CHEST 1 VIEW: CPT

## 2017-08-29 PROCEDURE — 99238 HOSP IP/OBS DSCHRG MGMT 30/<: CPT

## 2017-08-29 PROCEDURE — 80061 LIPID PANEL: CPT

## 2017-08-29 PROCEDURE — 92978 ENDOLUMINL IVUS OCT C 1ST: CPT | Mod: LD

## 2017-08-29 PROCEDURE — C1894: CPT

## 2017-08-29 PROCEDURE — C1757: CPT

## 2017-08-29 PROCEDURE — 85730 THROMBOPLASTIN TIME PARTIAL: CPT

## 2017-08-29 PROCEDURE — 93460 R&L HRT ART/VENTRICLE ANGIO: CPT | Mod: 59

## 2017-08-29 PROCEDURE — 86900 BLOOD TYPING SEROLOGIC ABO: CPT

## 2017-08-29 PROCEDURE — 86901 BLOOD TYPING SEROLOGIC RH(D): CPT

## 2017-08-29 PROCEDURE — C1889: CPT

## 2017-08-29 PROCEDURE — 90791 PSYCH DIAGNOSTIC EVALUATION: CPT

## 2017-08-29 PROCEDURE — 84443 ASSAY THYROID STIM HORMONE: CPT

## 2017-08-29 PROCEDURE — 82550 ASSAY OF CK (CPK): CPT

## 2017-08-29 PROCEDURE — 83735 ASSAY OF MAGNESIUM: CPT

## 2017-08-29 PROCEDURE — 82553 CREATINE MB FRACTION: CPT

## 2017-08-29 PROCEDURE — 80048 BASIC METABOLIC PNL TOTAL CA: CPT

## 2017-08-29 PROCEDURE — 83036 HEMOGLOBIN GLYCOSYLATED A1C: CPT

## 2017-08-29 PROCEDURE — 85027 COMPLETE CBC AUTOMATED: CPT

## 2017-08-29 PROCEDURE — 86850 RBC ANTIBODY SCREEN: CPT

## 2017-08-29 PROCEDURE — 84484 ASSAY OF TROPONIN QUANT: CPT

## 2017-08-29 PROCEDURE — C1725: CPT

## 2017-08-29 PROCEDURE — 93005 ELECTROCARDIOGRAM TRACING: CPT

## 2017-08-29 PROCEDURE — C1769: CPT

## 2017-08-29 PROCEDURE — 84100 ASSAY OF PHOSPHORUS: CPT

## 2017-08-29 PROCEDURE — 85610 PROTHROMBIN TIME: CPT

## 2017-08-29 PROCEDURE — C1874: CPT

## 2017-08-29 PROCEDURE — C1887: CPT

## 2017-08-29 PROCEDURE — 33967 INSERT I-AORT PERCUT DEVICE: CPT

## 2017-08-29 PROCEDURE — 76937 US GUIDE VASCULAR ACCESS: CPT

## 2017-08-29 RX ORDER — METOPROLOL TARTRATE 50 MG
0.5 TABLET ORAL
Qty: 15 | Refills: 0
Start: 2017-08-29 | End: 2017-09-28

## 2017-08-29 RX ORDER — RANITIDINE HYDROCHLORIDE 150 MG/1
1 TABLET, FILM COATED ORAL
Qty: 0 | Refills: 0 | COMMUNITY

## 2017-08-29 RX ORDER — METOPROLOL TARTRATE 50 MG
0.5 TABLET ORAL
Qty: 15 | Refills: 0 | OUTPATIENT
Start: 2017-08-29 | End: 2017-09-28

## 2017-08-29 RX ADMIN — TICAGRELOR 90 MILLIGRAM(S): 90 TABLET ORAL at 05:57

## 2017-08-29 NOTE — PROGRESS NOTE ADULT - PROBLEM SELECTOR PROBLEM 1
Anterior wall myocardial infarction

## 2017-08-29 NOTE — PROGRESS NOTE ADULT - ATTENDING COMMENTS
Patient was seen and examined with CCU team. I agree with findings and plan. d/c with lifevest
Patient was seen and examined with CCU team. I agree with findings and plan.

## 2017-08-29 NOTE — PROGRESS NOTE ADULT - PROBLEM SELECTOR PROBLEM 2
Ventricular tachycardia
Ventricular tachycardia
Acute heart failure, unspecified heart failure type
Ventricular tachycardia
Ventricular tachycardia

## 2017-08-29 NOTE — CONSULT NOTE ADULT - SUBJECTIVE AND OBJECTIVE BOX
Behavioral Cardiology        Patient information:                                                                                                                                                                                                                                                                                                                                                                                                                                                                                                                                                  51 y/o male, ; lives with wife and 2 daughters (age 16, 14) in [place].  Works in jewelry business.                          PMH:    No significant past medical history; former smoker. Admitted with AWSTEMI, s/p CATH DESx3.  EF 33%, plan for discharge with Life Vest.         Behavioral Health Assessment:   Endorsed periods of anxiety (increased worrying, nervousness) in setting of current health issues and worries about future wellbeing.   Denies panic attacks.  Denies symptoms of depression.  Denies anhedonia.  Denies s/i.   Sleeps 6-7hrs/night.   Appetite good.  Works in the jewelry business, reports daily stress related to job which has been ongoing for many years.   Receptive to psychoeducation on impact of stress on health.  Coping strategies include reading on line, spending time with family and friends.         Former smoker; quit 3 months ago for health reasons (Cardiologist advised him to quit following episode of chest pain).       Current alcohol use: 3 glasses of wine once/week.  Substance use: marijuana use, 1-2 joints/week.  Education on heart health and limiting alcohol and substance use provided.     Pt verbalized a good understanding of current health issues and treatment plan (medications, life vest, MD follow up).    Expressed motivation to make several life style changes to promote optimum health (managing stress, starting yoga, eating healthy diet, increasing exercise).       MSE:    Pt seen sitting comfortably in chair.   A&Ox3.   Pleasant and cooperative, well related with good eye contact.   No abnormal body movements.   Speech normal rate and volume.  Thought process goal directed.  No evidence of abnormal thought content.  Denies s/i.   Mood “I’m scared.”  Affect slightly anxious. Insight and judgment adequate.   Memory and cognition grossly intact.          Impression:  51 y/o man with no previous medical history, former smoker (quit 3 months ago), admitted with [employer] s/p DESx3.   Interested in learning about healthy lifestyle habits and managing stress more adaptively.   Discussed benefits of ongoing support with making these changes.   Reviewed and practiced stress reduction and mindful breathing exercise.   Experienced periods of anxiety during hospitalization in setting of worries about future well being.   Denies past psychiatric history.  Understands importance of following all treatment recommendations including medication adherence and MD follow up.       Dx:   Adjustment disorder with anxiety.      Recommendations:    (1) Cardiac rehab.  (2)  Diet consult.  (3)  Stress reduction and relaxation strategies (4)  Information on outpatient Behavioral Cardiology services provided including [facility].
HISTORY OF PRESENT ILLNESS:    52 M with no known prior medical history, initially presented with chest discomfort. The pain started 3 days prior to presentation and he developed mid sternal chest pain with associated diaphoresis while at rest. On presentation to an OSH it was noted he had an anterior wall STEMI on EKG and he was loaded with clopidogril, aspirin, Heparin and transferred to [facility] for urgent cardiac catheterization. Cath showed, a 100% stenosis in the pLAD. and an RCA which was 100% occluded with collaterals. He is s/p 3 EZRA to the LAD and initial EF was depressed to 20-25%, initially requiring IABP and RHC for hemodynamic monitoring, but has since improved, with most recent TTE still showing a depressed EF of 33%. He had multiple episodes of brief NSVT, which has been improving.      Allergies    No Known Allergies    Intolerances    	    MEDICATIONS:  aspirin enteric coated 81 milliGRAM(s) Oral daily  ticagrelor 90 milliGRAM(s) Oral two times a day  metoprolol succinate ER 12.5 milliGRAM(s) Oral at bedtime  ALPRAZolam 0.25 milliGRAM(s) Oral four times a day PRN  atorvastatin 80 milliGRAM(s) Oral at bedtime        PAST MEDICAL & SURGICAL HISTORY:  No pertinent past medical history  No significant past surgical history      FAMILY HISTORY:  No pertinent family history in first degree relatives      SOCIAL HISTORY:   [ ] Non-smoker (former)    REVIEW OF SYSTEMS:  Constitutional: No fevers  HEENT: No blurred vision, No gingival bleeding  Respiratory: No dyspnea at rest  CV: No chest pain  GI: No nausea/vomiting  Extremities: No lower extremity swelling  Neurologic: No dizziness  Skin: No rash  MSK: No joint pain  Heme: No easy bruising    PHYSICAL EXAM:  T(C): 36.7 (08-28-17 @ 07:00), Max: 36.9 (08-27-17 @ 19:00)  HR: 60 (08-28-17 @ 14:00) (52 - 76)  BP: 98/69 (08-28-17 @ 14:00) (83/57 - 106/73)  RR: 16 (08-28-17 @ 14:00) (15 - 18)  SpO2: --  Wt(kg): --  I&O's Summary    27 Aug 2017 07:01  -  28 Aug 2017 07:00  --------------------------------------------------------  IN: 840 mL / OUT: 1103 mL / NET: -263 mL    28 Aug 2017 07:01  -  28 Aug 2017 14:11  --------------------------------------------------------  IN: 480 mL / OUT: 0 mL / NET: 480 mL        Appearance: No Acute Distress	  HEENT: No JVD	  Cardiovascular: Normal S1 S2, RRR, No murmurs/rubs/gallops  Respiratory: Lungs clear to auscultation bilaterally  Gastrointestinal: Soft, Non-tender  Skin: No ecchymoses, No cyanosis  Neurologic: Non-focal  Extremities: No clubbing, cyanosis or edema  Vascular: Peripheral pulses palpable 2+ bilaterally  Psychiatry: A & O x 3, Mood & affect appropriate    LABS:	 	    CBC Full  -  ( 28 Aug 2017 05:41 )  WBC Count : 9.4 K/uL  Hemoglobin : 13.9 g/dL  Hematocrit : 41.1 %  Platelet Count - Automated : 181 K/uL  Mean Cell Volume : 95.6 fl  Mean Cell Hemoglobin : 32.3 pg  Mean Cell Hemoglobin Concentration : 33.8 gm/dL  Auto Neutrophil # : 5.8 K/uL  Auto Lymphocyte # : 2.3 K/uL  Auto Monocyte # : 1.2 K/uL  Auto Eosinophil # : 0.1 K/uL  Auto Basophil # : 0.0 K/uL  Auto Neutrophil % : 61.5 %  Auto Lymphocyte % : 24.1 %  Auto Monocyte % : 12.6 %  Auto Eosinophil % : 1.4 %  Auto Basophil % : 0.4 %    08-28    140  |  103  |  14  ----------------------------<  110<H>  4.3   |  25  |  1.11  08-27    139  |  104  |  12  ----------------------------<  110<H>  4.3   |  25  |  0.89    Ca    8.5      28 Aug 2017 05:41  Ca    8.3<L>      27 Aug 2017 04:49  Phos  3.2     08-28  Phos  2.4     08-27  Mg     2.1     08-28  Mg     2.3     08-27    TPro  6.6  /  Alb  3.7  /  TBili  0.7  /  DBili  x   /  AST  79<H>  /  ALT  43  /  AlkPhos  58  08-28  TPro  6.3  /  Alb  3.6  /  TBili  0.9  /  DBili  x   /  AST  172<H>  /  ALT  56<H>  /  AlkPhos  57  08-27      proBNP:   Lipid Profile:   HgA1c:   TSH:     TELEMETRY: NSR  ECG:      DIAGNOSTIC TESTING:    [ ] Echocardiogram:  < from: TTE with Doppler (w/Cont) (08.27.17 @ 10:44) >  CONCLUSIONS:  Limited echo performed to evaluate LV function.  Severe segmental left ventricular systolic dysfunction.  Severe hypokinesis of the septum, mid-distal anterior wall,  Mid to distal anterolateral wall, apex .   Endocardial  visualization enhanced with intravenous injection of echo  contrast (Definity). Smoke  in apex however No left  ventricular thrombus. EF 33% by Bi-plane Simpsons  *** Compared with echocardiogram of 8/25/2017, EF has  slightly improved from 20-25% --> 33%  ------------------------------------------------------------------------  Confirmed on  8/27/2017 - 14:08:32 by Deysi Pierre M.D.    < end of copied text >

## 2017-08-29 NOTE — PROGRESS NOTE ADULT - PROBLEM SELECTOR PLAN 1
DAPT, Statin, BB -->off ACE I due to B/P   Life Vest Evaluation today   TTE yesterday--48 hours post MI--no LV thrombus   EF--33%--sev LV sys dysfunction DAPT, Statin, BB -->off ACE I due to B/P   Received Life Vest Yesterday  TTE yesterday showed no LV thrombus   EF--33%--severe LV sys dysfunction

## 2017-08-29 NOTE — PROGRESS NOTE ADULT - PROBLEM SELECTOR PLAN 3
Systolic Ischemic Cardiomyopathy Off ACE-I 2/2 decreased B/P.  Will need Life vest evaluation. Will have EP follow up today Systolic Ischemic Cardiomyopathy Off ACE-I 2/2 decreased B/P.  Will go home with Life Vest today.

## 2017-08-29 NOTE — PROGRESS NOTE ADULT - PROBLEM SELECTOR PLAN 2
NSVT runs over the weekend--Controlled on BB  Electrically stable past 12 hours NSVT runs over the weekend--Controlled on BB  Electrically stable past 36 hours

## 2017-08-29 NOTE — PROGRESS NOTE ADULT - SUBJECTIVE AND OBJECTIVE BOX
Chief Complaint: Chest Discomfort     HPI:  51 Y/o M No significant medical history, stopped smoking past month after cardiac work-up for chest pain which proved negative. Pain at that time was dissimilar from his current  admitting chest  discomfort.   He states 3 days ago he developed mid sternal chest pain with associated diaphoresis while at rest, pain lasted up to 30 minutes.  The following day it reoccurred and  lasted fa little longer.   Last evening it reoccurred with greater intensity and duration.  On presentation to [facility] it was noted he had an Anterior wall STEMI.  He was loaded with Clopidogril, Aspirin, Heparin and MSO4 and transferred to [facility] for urgent cardiac catheterization.  Cath showed 100% stenosis in p LAD.  EZRA X3.  RCA was 100% with collaterals .  PCWP 31.  CI 2.54 PA Sat 69% PAP 47/26/35. IABP support and right heart cath was placed.   Lasix 20 mgm IV was given. (25 Aug 2017 07:49)    Overnight Events: No acute events overnight. No CP, SOB     Review of Systems: GEN: no fevers, chills, no night sweats no weight loss , no swollen lymph nodes    EYES: No blurry vision , no changes in vision  ENT: no sores, no runny nose, no sore throat   PULM: no cough, no shortness of breath   CARD: no chest pain, no palpitations    GI : no abdominal pain , no nausea, no vomiting  : no burning urination, no change in color of urine, no flank pain, no blood in urine   MSK: no swelling   SKIN: no bruising or bleeding, no sores in mouth  , no yellowing of the skin  Neuro: no lightheadedness, no headaches, no weakness, no fainting, no confusion , no seizures      Allergies    No Known Allergies        MEDICATIONS  (STANDING):  aspirin enteric coated 81 milliGRAM(s) Oral daily  ticagrelor 90 milliGRAM(s) Oral two times a day  atorvastatin 80 milliGRAM(s) Oral at bedtime  metoprolol 12.5 milliGRAM(s) Oral two times a day    MEDICATIONS  (PRN):  ALPRAZolam 0.25 milliGRAM(s) Oral four times a day PRN anxiety        PMH/PSH/FH/SH: [ ] Unchanged    Vitals:  ICU Vital Signs Last 24 Hrs  T(C): 36.7 (28 Aug 2017 07:00), Max: 36.9 (27 Aug 2017 19:00)  T(F): 98.1 (28 Aug 2017 07:00), Max: 98.5 (27 Aug 2017 19:00)  HR: 64 (28 Aug 2017 07:00) (52 - 76)  BP: 94/59 (28 Aug 2017 07:00) (83/57 - 105/77)  BP(mean): 69 (28 Aug 2017 07:00) (65 - 88)  ABP: --  ABP(mean): --  RR: 16 (28 Aug 2017 07:00) (15 - 18)  SpO2: 97% (27 Aug 2017 08:00) (97% - 97%)    PHYSICAL EXAM:  GENERAL: NAD, well-developed  HEAD:  Atraumatic, Normocephalic  EYES: EOMI, PERRLA, conjunctiva and sclera clear  NECK: Supple, No JVD  CHEST/LUNG: Clear to auscultation bilaterally; No wheeze  HEART: Regular rate and rhythm; No murmurs, rubs, or gallops  ABDOMEN: Soft, Nontender, Nondistended; Bowel sounds present  EXTREMITIES:  2+ Peripheral Pulses, No clubbing, cyanosis, or edema  PSYCH: AAOx3  NEUROLOGY: non-focal  SKIN: No rashes or lesions    Labs:                                   13.9   9.4   )-----------( 181      ( 28 Aug 2017 05:41 )             41.1     08-28    140  |  103  |  14  ----------------------------<  110<H>  4.3   |  25  |  1.11    Ca    8.5      28 Aug 2017 05:41  Phos  3.2     08-28  Mg     2.1     08-28    TPro  6.6  /  Alb  3.7  /  TBili  0.7  /  DBili  x   /  AST  79<H>  /  ALT  43  /  AlkPhos  58  08-28    Magnesium, Serum: 2.3 mg/dL (08-27 @ 04:49)  Phosphorus Level, Serum: 2.4 mg/dL (08-27 @ 04:49)              ECG: IRIS v2-3--2-3 mm--improved from before     Echo:< from: TTE with Doppler (w/Cont) (08.25.17 @ 10:58) >  Severe segmental left ventricular systolic dysfunction.  Severe hypokinesis/akinesis of the anteroseptum, anterior  wall., apex, distal inferior/inferolateral wall.  No thrombusR< end of copied text >      Stress Testing:     Cath:8/26: 100% LAD, EZRA , !))% RCA W Collaterals, OM1 70% ? eventual intervention     Imaging:    Interpretation of Telemetry: Sinus rhythm No arrythmia Chief Complaint: Chest Discomfort     HPI:  51 Y/o M No significant medical history, stopped smoking past month after cardiac work-up for chest pain which proved negative. Pain at that time was dissimilar from his current  admitting chest  discomfort.   He states 3 days ago he developed mid sternal chest pain with associated diaphoresis while at rest, pain lasted up to 30 minutes.  The following day it reoccurred and  lasted fa little longer.   Last evening it reoccurred with greater intensity and duration.  On presentation to [facility] it was noted he had an Anterior wall STEMI.  He was loaded with Clopidogril, Aspirin, Heparin and MSO4 and transferred to [facility] for urgent cardiac catheterization.  Cath showed 100% stenosis in p LAD.  EZRA X3.  RCA was 100% with collaterals .  PCWP 31.  CI 2.54 PA Sat 69% PAP 47/26/35. IABP support and right heart cath was placed.   Lasix 20 mgm IV was given. (25 Aug 2017 07:49)    Overnight Events: No acute events overnight. No CP, SOB     Review of Systems: GEN: no fevers, chills, no night sweats no weight loss , no swollen lymph nodes    EYES: No blurry vision , no changes in vision  ENT: no sores, no runny nose, no sore throat   PULM: no cough, no shortness of breath   CARD: no chest pain, no palpitations    GI : no abdominal pain , no nausea, no vomiting  : no burning urination, no change in color of urine, no flank pain, no blood in urine   MSK: no swelling   SKIN: no bruising or bleeding, no sores in mouth  , no yellowing of the skin  Neuro: no lightheadedness, no headaches, no weakness, no fainting, no confusion , no seizures      Allergies    No Known Allergies    MEDICATIONS  (STANDING):  aspirin enteric coated 81 milliGRAM(s) Oral daily  ticagrelor 90 milliGRAM(s) Oral two times a day  atorvastatin 80 milliGRAM(s) Oral at bedtime  metoprolol succinate ER 12.5 milliGRAM(s) Oral at bedtime    MEDICATIONS  (PRN):  ALPRAZolam 0.25 milliGRAM(s) Oral four times a day PRN anxiety    PMH/PSH/FH/SH: [ ] Unchanged    Vitals:  ICU Vital Signs Last 24 Hrs  T(C): 36.7 (29 Aug 2017 07:40), Max: 36.9 (28 Aug 2017 19:15)  T(F): 98 (29 Aug 2017 07:40), Max: 98.5 (28 Aug 2017 19:15)  HR: 62 (29 Aug 2017 07:40) (54 - 80)  BP: 99/58 (29 Aug 2017 07:40) (85/54 - 108/84)  BP(mean): 70 (29 Aug 2017 07:40) (62 - 93)  ABP: --  ABP(mean): --  RR: 17 (29 Aug 2017 07:40) (15 - 18)  SpO2: 97% (29 Aug 2017 06:00) (97% - 97%)      PHYSICAL EXAM:  GENERAL: NAD, well-developed  HEAD:  Atraumatic, Normocephalic  EYES: EOMI, PERRLA, conjunctiva and sclera clear  NECK: Supple, No JVD  CHEST/LUNG: Clear to auscultation bilaterally; No wheeze  HEART: Regular rate and rhythm; No murmurs, rubs, or gallops  ABDOMEN: Soft, Nontender, Nondistended; Bowel sounds present  EXTREMITIES:  2+ Peripheral Pulses, No clubbing, cyanosis, or edema  PSYCH: AAOx3  NEUROLOGY: non-focal  SKIN: No rashes or lesions    Labs:                                           14.0   8.7   )-----------( 220      ( 29 Aug 2017 04:35 )             40.2     08-29    138  |  101  |  14  ----------------------------<  116<H>  4.2   |  24  |  0.90    Ca    8.9      29 Aug 2017 04:35  Phos  3.9     08-29  Mg     2.1     08-29    TPro  6.7  /  Alb  3.8  /  TBili  0.8  /  DBili  x   /  AST  45<H>  /  ALT  36  /  AlkPhos  64  08-29      ECG: IRIS v2-3--2-3 mm--improved from before     Echo:< from: TTE with Doppler (w/Cont) (08.25.17 @ 10:58) >  Severe segmental left ventricular systolic dysfunction.  Severe hypokinesis/akinesis of the anteroseptum, anterior  wall., apex, distal inferior/inferolateral wall.  No thrombusR< end of copied text >      Stress Testing:     Cath:8/26: 100% LAD, EZRA , !))% RCA W Collaterals, OM1 70% ? eventual intervention     Imaging:    Interpretation of Telemetry: Sinus rhythm No arrythmia

## 2017-08-29 NOTE — PROGRESS NOTE ADULT - ASSESSMENT
51 Y/O M w late presentation AWSTEMI  Peak CK over 600. IABP and Right heart cath assist, now D/Francisco.  Severe LV disfunction will need life vest on d/c . Frequent ventricular ectopy now diminished

## 2017-09-22 ENCOUNTER — APPOINTMENT (OUTPATIENT)
Dept: CARDIOLOGY | Facility: CLINIC | Age: 52
End: 2017-09-22
Payer: MEDICAID

## 2017-09-22 ENCOUNTER — NON-APPOINTMENT (OUTPATIENT)
Age: 52
End: 2017-09-22

## 2017-09-22 VITALS
DIASTOLIC BLOOD PRESSURE: 67 MMHG | HEART RATE: 54 BPM | BODY MASS INDEX: 25.17 KG/M2 | SYSTOLIC BLOOD PRESSURE: 100 MMHG | WEIGHT: 192 LBS | HEIGHT: 73.2 IN | OXYGEN SATURATION: 100 %

## 2017-09-22 PROCEDURE — 99215 OFFICE O/P EST HI 40 MIN: CPT

## 2017-09-22 PROCEDURE — 93000 ELECTROCARDIOGRAM COMPLETE: CPT

## 2017-09-22 RX ORDER — ASPIRIN 81 MG/1
81 TABLET ORAL
Qty: 90 | Refills: 3 | Status: ACTIVE | COMMUNITY
Start: 2017-09-22 | End: 1900-01-01

## 2017-09-29 ENCOUNTER — APPOINTMENT (OUTPATIENT)
Dept: ELECTROPHYSIOLOGY | Facility: CLINIC | Age: 52
End: 2017-09-29

## 2017-10-09 ENCOUNTER — OTHER (OUTPATIENT)
Age: 52
End: 2017-10-09

## 2017-10-17 ENCOUNTER — APPOINTMENT (OUTPATIENT)
Dept: CARDIOLOGY | Facility: CLINIC | Age: 52
End: 2017-10-17

## 2017-10-20 ENCOUNTER — APPOINTMENT (OUTPATIENT)
Dept: ELECTROPHYSIOLOGY | Facility: CLINIC | Age: 52
End: 2017-10-20
Payer: MEDICAID

## 2017-10-20 VITALS
SYSTOLIC BLOOD PRESSURE: 105 MMHG | HEIGHT: 72 IN | OXYGEN SATURATION: 97 % | WEIGHT: 194 LBS | DIASTOLIC BLOOD PRESSURE: 71 MMHG | BODY MASS INDEX: 26.28 KG/M2 | HEART RATE: 63 BPM

## 2017-10-20 DIAGNOSIS — Z78.9 OTHER SPECIFIED HEALTH STATUS: ICD-10-CM

## 2017-10-20 DIAGNOSIS — Z87.891 PERSONAL HISTORY OF NICOTINE DEPENDENCE: ICD-10-CM

## 2017-10-20 PROCEDURE — 93000 ELECTROCARDIOGRAM COMPLETE: CPT

## 2017-10-20 PROCEDURE — 99215 OFFICE O/P EST HI 40 MIN: CPT

## 2017-10-27 ENCOUNTER — OUTPATIENT (OUTPATIENT)
Dept: OUTPATIENT SERVICES | Facility: HOSPITAL | Age: 52
LOS: 1 days | End: 2017-10-27
Payer: MEDICAID

## 2017-10-27 VITALS
TEMPERATURE: 98 F | SYSTOLIC BLOOD PRESSURE: 119 MMHG | HEART RATE: 49 BPM | WEIGHT: 190.04 LBS | DIASTOLIC BLOOD PRESSURE: 68 MMHG | RESPIRATION RATE: 18 BRPM | HEIGHT: 74 IN | OXYGEN SATURATION: 98 %

## 2017-10-27 DIAGNOSIS — Z95.5 PRESENCE OF CORONARY ANGIOPLASTY IMPLANT AND GRAFT: Chronic | ICD-10-CM

## 2017-10-27 DIAGNOSIS — R07.89 OTHER CHEST PAIN: ICD-10-CM

## 2017-10-27 LAB
ALBUMIN SERPL ELPH-MCNC: 4.6 G/DL — SIGNIFICANT CHANGE UP (ref 3.3–5)
ALP SERPL-CCNC: 88 U/L — SIGNIFICANT CHANGE UP (ref 40–120)
ALT FLD-CCNC: 30 U/L RC — SIGNIFICANT CHANGE UP (ref 10–45)
ANION GAP SERPL CALC-SCNC: 12 MMOL/L — SIGNIFICANT CHANGE UP (ref 5–17)
AST SERPL-CCNC: 28 U/L — SIGNIFICANT CHANGE UP (ref 10–40)
BILIRUB SERPL-MCNC: 1 MG/DL — SIGNIFICANT CHANGE UP (ref 0.2–1.2)
BUN SERPL-MCNC: 13 MG/DL — SIGNIFICANT CHANGE UP (ref 7–23)
CALCIUM SERPL-MCNC: 9 MG/DL — SIGNIFICANT CHANGE UP (ref 8.4–10.5)
CHLORIDE SERPL-SCNC: 101 MMOL/L — SIGNIFICANT CHANGE UP (ref 96–108)
CO2 SERPL-SCNC: 28 MMOL/L — SIGNIFICANT CHANGE UP (ref 22–31)
CREAT SERPL-MCNC: 1.13 MG/DL — SIGNIFICANT CHANGE UP (ref 0.5–1.3)
GLUCOSE SERPL-MCNC: 89 MG/DL — SIGNIFICANT CHANGE UP (ref 70–99)
HCT VFR BLD CALC: 46.6 % — SIGNIFICANT CHANGE UP (ref 39–50)
HGB BLD-MCNC: 15.4 G/DL — SIGNIFICANT CHANGE UP (ref 13–17)
MCHC RBC-ENTMCNC: 31.8 PG — SIGNIFICANT CHANGE UP (ref 27–34)
MCHC RBC-ENTMCNC: 33.1 GM/DL — SIGNIFICANT CHANGE UP (ref 32–36)
MCV RBC AUTO: 96 FL — SIGNIFICANT CHANGE UP (ref 80–100)
PLATELET # BLD AUTO: 274 K/UL — SIGNIFICANT CHANGE UP (ref 150–400)
POTASSIUM SERPL-MCNC: 4.1 MMOL/L — SIGNIFICANT CHANGE UP (ref 3.5–5.3)
POTASSIUM SERPL-SCNC: 4.1 MMOL/L — SIGNIFICANT CHANGE UP (ref 3.5–5.3)
PROT SERPL-MCNC: 7.1 G/DL — SIGNIFICANT CHANGE UP (ref 6–8.3)
RBC # BLD: 4.85 M/UL — SIGNIFICANT CHANGE UP (ref 4.2–5.8)
RBC # FLD: 11.7 % — SIGNIFICANT CHANGE UP (ref 10.3–14.5)
SODIUM SERPL-SCNC: 141 MMOL/L — SIGNIFICANT CHANGE UP (ref 135–145)
WBC # BLD: 7.4 K/UL — SIGNIFICANT CHANGE UP (ref 3.8–10.5)
WBC # FLD AUTO: 7.4 K/UL — SIGNIFICANT CHANGE UP (ref 3.8–10.5)

## 2017-10-27 PROCEDURE — 99152 MOD SED SAME PHYS/QHP 5/>YRS: CPT

## 2017-10-27 PROCEDURE — C1769: CPT

## 2017-10-27 PROCEDURE — C1887: CPT

## 2017-10-27 PROCEDURE — 93010 ELECTROCARDIOGRAM REPORT: CPT

## 2017-10-27 PROCEDURE — C1894: CPT

## 2017-10-27 PROCEDURE — 93458 L HRT ARTERY/VENTRICLE ANGIO: CPT

## 2017-10-27 PROCEDURE — 93005 ELECTROCARDIOGRAM TRACING: CPT

## 2017-10-27 PROCEDURE — 85027 COMPLETE CBC AUTOMATED: CPT

## 2017-10-27 PROCEDURE — 80053 COMPREHEN METABOLIC PANEL: CPT

## 2017-10-27 RX ORDER — SPIRONOLACTONE 25 MG/1
0.5 TABLET, FILM COATED ORAL
Qty: 15 | Refills: 0
Start: 2017-10-27 | End: 2017-11-26

## 2017-10-27 NOTE — H&P CARDIOLOGY - HISTORY OF PRESENT ILLNESS
This is a 53 Y/o M former smoker 35 pack years, MI (STEMI) 8/2017 Cardiac cath revealed 100% stenosis in p LAD.  EZRA X3.  RCA was 100% with collaterals .  PCWP 31.  CI 2.54 PA Sat 69% PAP 47/26/35 requiring IABP support and was discharge with lifevest (pt. stopped wearing lifevest approx 2 weeks ago without physician notification) EF 20-25%.  Pt. states he has been having SOB/STAFFORD and heartburn with exertion over past month.  Pt. states he notices the symptoms when he is walking long distances and resolves after a few seconds.  Pt. denies dizziness, diaphoresis, palpitations, nausea, vomiting, peripheral edema, weight gain, or syncope.  Pt. presents today asymptomatic for further evaluation and cardiac cath.     < from: Cardiac Cath Lab - Adult (08.25.17 @ 05:52) >   EF estimated was 20 %.  CORONARY VESSELS: The coronary circulation is right dominant.  LM:   --  LM: Angiography showed minor luminal irregularities with no flow  limiting lesions.  LAD:   --  Proximal LAD: There was a 100 % stenosis. The lesion was  associated with a large filling defect consistent with thrombus. There was  JANICE grade 0 flow through the vessel (no flow).  --  Mid LAD: There was a 70 % stenosis.  CX:   --  OM2: There was a discrete 70 % stenosis.  RCA:   --  Proximal RCA: There was a 99 % stenosis.  --  Mid RCA: There was a 100 % stenosis.  COMPLICATIONS: There were no complications.  DIAGNOSTIC IMPRESSIONS: Ant STEMI, transfered from [facility]. Culprit  vessel the LAD. EF approximately 20%. IABP placed at conclusion to help  with treatment of congestion and titration of meds.  INTERVENTIONAL RECOMMENDATIONS: Aspirin and Brilinta    < end of copied text >    < from: TTE with Doppler (w/Cont) (08.27.17 @ 10:44) >  Limited echo performed to evaluate LV function.  Severe segmental left ventricular systolic dysfunction.  Severe hypokinesis of the septum, mid-distal anterior wall,  Mid to distal anterolateral wall, apex .   Endocardial  visualization enhanced with intravenous injection of echo  contrast (Definity). Smoke  in apex however No left  ventricular thrombus. EF 33% by Bi-plane Simpsons  *** Compared with echocardiogram of 8/25/2017, EF has  slightly improved from 20-25% --> 33%    < end of copied text >

## 2017-11-21 ENCOUNTER — OTHER (OUTPATIENT)
Age: 52
End: 2017-11-21

## 2017-11-22 ENCOUNTER — APPOINTMENT (OUTPATIENT)
Dept: CV DIAGNOSITCS | Facility: HOSPITAL | Age: 52
End: 2017-11-22

## 2017-11-22 ENCOUNTER — OUTPATIENT (OUTPATIENT)
Dept: OUTPATIENT SERVICES | Facility: HOSPITAL | Age: 52
LOS: 1 days | End: 2017-11-22
Payer: MEDICAID

## 2017-11-22 ENCOUNTER — NON-APPOINTMENT (OUTPATIENT)
Age: 52
End: 2017-11-22

## 2017-11-22 ENCOUNTER — APPOINTMENT (OUTPATIENT)
Dept: CARDIOLOGY | Facility: CLINIC | Age: 52
End: 2017-11-22
Payer: MEDICAID

## 2017-11-22 VITALS
HEIGHT: 72 IN | HEART RATE: 53 BPM | WEIGHT: 194 LBS | SYSTOLIC BLOOD PRESSURE: 109 MMHG | OXYGEN SATURATION: 98 % | DIASTOLIC BLOOD PRESSURE: 75 MMHG | BODY MASS INDEX: 26.28 KG/M2

## 2017-11-22 DIAGNOSIS — I25.10 ATHEROSCLEROTIC HEART DISEASE OF NATIVE CORONARY ARTERY WITHOUT ANGINA PECTORIS: ICD-10-CM

## 2017-11-22 DIAGNOSIS — Z95.5 PRESENCE OF CORONARY ANGIOPLASTY IMPLANT AND GRAFT: Chronic | ICD-10-CM

## 2017-11-22 PROCEDURE — 93306 TTE W/DOPPLER COMPLETE: CPT | Mod: 26

## 2017-11-22 PROCEDURE — 93306 TTE W/DOPPLER COMPLETE: CPT

## 2017-11-22 PROCEDURE — 93000 ELECTROCARDIOGRAM COMPLETE: CPT

## 2017-11-22 PROCEDURE — 99214 OFFICE O/P EST MOD 30 MIN: CPT

## 2018-02-21 ENCOUNTER — APPOINTMENT (OUTPATIENT)
Dept: CARDIOLOGY | Facility: CLINIC | Age: 53
End: 2018-02-21

## 2018-02-21 ENCOUNTER — OUTPATIENT (OUTPATIENT)
Dept: OUTPATIENT SERVICES | Facility: HOSPITAL | Age: 53
LOS: 1 days | End: 2018-02-21
Payer: MEDICAID

## 2018-02-21 ENCOUNTER — APPOINTMENT (OUTPATIENT)
Dept: CV DIAGNOSITCS | Facility: HOSPITAL | Age: 53
End: 2018-02-21

## 2018-02-21 DIAGNOSIS — I21.09 ST ELEVATION (STEMI) MYOCARDIAL INFARCTION INVOLVING OTHER CORONARY ARTERY OF ANTERIOR WALL: ICD-10-CM

## 2018-02-21 DIAGNOSIS — Z95.5 PRESENCE OF CORONARY ANGIOPLASTY IMPLANT AND GRAFT: Chronic | ICD-10-CM

## 2018-02-21 PROCEDURE — 93306 TTE W/DOPPLER COMPLETE: CPT | Mod: 26

## 2018-02-21 PROCEDURE — C8929: CPT

## 2018-04-18 ENCOUNTER — APPOINTMENT (OUTPATIENT)
Dept: CARDIOLOGY | Facility: CLINIC | Age: 53
End: 2018-04-18
Payer: MEDICAID

## 2018-04-18 ENCOUNTER — NON-APPOINTMENT (OUTPATIENT)
Age: 53
End: 2018-04-18

## 2018-04-18 VITALS — DIASTOLIC BLOOD PRESSURE: 70 MMHG | SYSTOLIC BLOOD PRESSURE: 110 MMHG

## 2018-04-18 PROCEDURE — 99214 OFFICE O/P EST MOD 30 MIN: CPT

## 2018-04-18 PROCEDURE — 93000 ELECTROCARDIOGRAM COMPLETE: CPT

## 2018-04-19 ENCOUNTER — RX RENEWAL (OUTPATIENT)
Age: 53
End: 2018-04-19

## 2018-07-17 PROBLEM — E78.5 HYPERLIPIDEMIA, UNSPECIFIED: Chronic | Status: ACTIVE | Noted: 2017-10-27

## 2018-07-17 PROBLEM — I21.3 ST ELEVATION (STEMI) MYOCARDIAL INFARCTION OF UNSPECIFIED SITE: Chronic | Status: ACTIVE | Noted: 2017-10-27

## 2018-07-18 ENCOUNTER — APPOINTMENT (OUTPATIENT)
Dept: CARDIOLOGY | Facility: CLINIC | Age: 53
End: 2018-07-18

## 2018-07-22 PROBLEM — Z78.9 ALCOHOL USE: Status: ACTIVE | Noted: 2017-10-20

## 2018-07-23 ENCOUNTER — NON-APPOINTMENT (OUTPATIENT)
Age: 53
End: 2018-07-23

## 2018-07-23 ENCOUNTER — APPOINTMENT (OUTPATIENT)
Dept: ELECTROPHYSIOLOGY | Facility: CLINIC | Age: 53
End: 2018-07-23
Payer: MEDICAID

## 2018-07-23 VITALS
OXYGEN SATURATION: 97 % | WEIGHT: 185 LBS | DIASTOLIC BLOOD PRESSURE: 69 MMHG | BODY MASS INDEX: 25.06 KG/M2 | HEIGHT: 72 IN | HEART RATE: 51 BPM | SYSTOLIC BLOOD PRESSURE: 102 MMHG

## 2018-07-23 PROCEDURE — 93000 ELECTROCARDIOGRAM COMPLETE: CPT

## 2018-07-23 PROCEDURE — 99215 OFFICE O/P EST HI 40 MIN: CPT

## 2018-07-23 RX ORDER — VARENICLINE TARTRATE 0.5 (11)-1
0.5 MG X 11 & KIT ORAL
Qty: 53 | Refills: 0 | Status: DISCONTINUED | COMMUNITY
Start: 2017-06-08 | End: 2018-07-23

## 2018-09-05 ENCOUNTER — NON-APPOINTMENT (OUTPATIENT)
Age: 53
End: 2018-09-05

## 2018-09-05 ENCOUNTER — APPOINTMENT (OUTPATIENT)
Dept: CARDIOLOGY | Facility: CLINIC | Age: 53
End: 2018-09-05
Payer: COMMERCIAL

## 2018-09-05 VITALS
DIASTOLIC BLOOD PRESSURE: 81 MMHG | HEART RATE: 55 BPM | OXYGEN SATURATION: 97 % | SYSTOLIC BLOOD PRESSURE: 119 MMHG | WEIGHT: 185 LBS | HEIGHT: 72 IN | BODY MASS INDEX: 25.06 KG/M2

## 2018-09-05 PROCEDURE — 93000 ELECTROCARDIOGRAM COMPLETE: CPT

## 2018-09-05 PROCEDURE — 99214 OFFICE O/P EST MOD 30 MIN: CPT

## 2018-09-24 ENCOUNTER — RX RENEWAL (OUTPATIENT)
Age: 53
End: 2018-09-24

## 2018-10-23 ENCOUNTER — RX RENEWAL (OUTPATIENT)
Age: 53
End: 2018-10-23

## 2019-01-22 ENCOUNTER — OUTPATIENT (OUTPATIENT)
Dept: OUTPATIENT SERVICES | Facility: HOSPITAL | Age: 54
LOS: 1 days | End: 2019-01-22
Payer: COMMERCIAL

## 2019-01-22 ENCOUNTER — APPOINTMENT (OUTPATIENT)
Dept: MRI IMAGING | Facility: CLINIC | Age: 54
End: 2019-01-22
Payer: COMMERCIAL

## 2019-01-22 DIAGNOSIS — I25.5 ISCHEMIC CARDIOMYOPATHY: ICD-10-CM

## 2019-01-22 DIAGNOSIS — Z95.5 PRESENCE OF CORONARY ANGIOPLASTY IMPLANT AND GRAFT: Chronic | ICD-10-CM

## 2019-01-22 PROCEDURE — A9585: CPT

## 2019-01-22 PROCEDURE — 75561 CARDIAC MRI FOR MORPH W/DYE: CPT | Mod: 26

## 2019-01-22 PROCEDURE — 75561 CARDIAC MRI FOR MORPH W/DYE: CPT

## 2019-01-25 ENCOUNTER — APPOINTMENT (OUTPATIENT)
Dept: ELECTROPHYSIOLOGY | Facility: CLINIC | Age: 54
End: 2019-01-25

## 2019-02-05 ENCOUNTER — MEDICATION RENEWAL (OUTPATIENT)
Age: 54
End: 2019-02-05

## 2019-03-06 ENCOUNTER — APPOINTMENT (OUTPATIENT)
Dept: CARDIOLOGY | Facility: CLINIC | Age: 54
End: 2019-03-06
Payer: COMMERCIAL

## 2019-03-06 ENCOUNTER — NON-APPOINTMENT (OUTPATIENT)
Age: 54
End: 2019-03-06

## 2019-03-06 VITALS
DIASTOLIC BLOOD PRESSURE: 81 MMHG | HEIGHT: 72 IN | SYSTOLIC BLOOD PRESSURE: 123 MMHG | HEART RATE: 51 BPM | WEIGHT: 200 LBS | OXYGEN SATURATION: 98 % | BODY MASS INDEX: 27.09 KG/M2

## 2019-03-06 PROCEDURE — 93000 ELECTROCARDIOGRAM COMPLETE: CPT

## 2019-03-06 PROCEDURE — 99214 OFFICE O/P EST MOD 30 MIN: CPT

## 2019-03-06 RX ORDER — TICAGRELOR 90 MG/1
90 TABLET ORAL
Qty: 90 | Refills: 3 | Status: DISCONTINUED | COMMUNITY
Start: 2017-09-22 | End: 2019-03-06

## 2019-03-06 NOTE — REASON FOR VISIT
[Follow-Up - Clinic] : a clinic follow-up of [Cardiomyopathy] : cardiomyopathy [Coronary Artery Disease] : coronary artery disease [Hyperlipidemia] : hyperlipidemia [Medication Management] : Medication management [Prior Myocardial Infarction] : a prior myocardial infarction

## 2019-03-08 NOTE — HISTORY OF PRESENT ILLNESS
[FreeTextEntry1] : Ezequiel is returning \par He was a late presentation anterior wall - underwent PCI to the LAD, required a IABP post procedure\par Recent MRI revealed an EF of 39%\par Has returned to exercise and has worked with a  without limitations\par \par No palpitations\par No Orthopnea\par No syncope\par No bleeding\par No LE edema\par \par

## 2019-03-08 NOTE — REVIEW OF SYSTEMS
[Negative] : Heme/Lymph [Feeling Fatigued] : not feeling fatigued [Dyspnea on exertion] : not dyspnea during exertion

## 2019-03-08 NOTE — DISCUSSION/SUMMARY
[FreeTextEntry1] : s/p ant MI - LAD PCI. Additional LCx disease - not significant on reassessment (repeat cath)\par \par [facility] meds\par No angina\par No HF\par May stop brilinta (>12 mo post PCI)\par \par Encouraged continued exercise\par Labs to assess need for high dose statin Rx

## 2019-03-13 LAB
ALBUMIN SERPL ELPH-MCNC: 4.4 G/DL
ALP BLD-CCNC: 89 U/L
ALT SERPL-CCNC: 24 U/L
ANION GAP SERPL CALC-SCNC: 9 MMOL/L
AST SERPL-CCNC: 27 U/L
BILIRUB SERPL-MCNC: 0.2 MG/DL
BUN SERPL-MCNC: 19 MG/DL
CALCIUM SERPL-MCNC: 9.1 MG/DL
CHLORIDE SERPL-SCNC: 104 MMOL/L
CHOLEST SERPL-MCNC: 130 MG/DL
CHOLEST/HDLC SERPL: 3.2 RATIO
CO2 SERPL-SCNC: 27 MMOL/L
CREAT SERPL-MCNC: 1.03 MG/DL
GLUCOSE SERPL-MCNC: 101 MG/DL
HBA1C MFR BLD HPLC: 5.9 %
HDLC SERPL-MCNC: 41 MG/DL
LDLC SERPL CALC-MCNC: 59 MG/DL
POTASSIUM SERPL-SCNC: 4.8 MMOL/L
PROT SERPL-MCNC: 6.5 G/DL
SODIUM SERPL-SCNC: 140 MMOL/L
TRIGL SERPL-MCNC: 149 MG/DL
TSH SERPL-ACNC: 2.29 UIU/ML

## 2019-05-01 ENCOUNTER — RX RENEWAL (OUTPATIENT)
Age: 54
End: 2019-05-01

## 2019-08-09 ENCOUNTER — RX RENEWAL (OUTPATIENT)
Age: 54
End: 2019-08-09

## 2019-09-04 ENCOUNTER — NON-APPOINTMENT (OUTPATIENT)
Age: 54
End: 2019-09-04

## 2019-09-04 ENCOUNTER — APPOINTMENT (OUTPATIENT)
Dept: CARDIOLOGY | Facility: CLINIC | Age: 54
End: 2019-09-04
Payer: COMMERCIAL

## 2019-09-04 VITALS
DIASTOLIC BLOOD PRESSURE: 70 MMHG | HEART RATE: 50 BPM | SYSTOLIC BLOOD PRESSURE: 107 MMHG | WEIGHT: 198 LBS | BODY MASS INDEX: 26.82 KG/M2 | OXYGEN SATURATION: 97 % | HEIGHT: 72 IN

## 2019-09-04 PROCEDURE — 93000 ELECTROCARDIOGRAM COMPLETE: CPT

## 2019-09-04 PROCEDURE — 99214 OFFICE O/P EST MOD 30 MIN: CPT

## 2019-09-04 RX ORDER — SPIRONOLACTONE 25 MG/1
25 TABLET ORAL DAILY
Qty: 90 | Refills: 3 | Status: DISCONTINUED | COMMUNITY
Start: 2017-10-27 | End: 2019-09-04

## 2019-09-04 NOTE — PHYSICAL EXAM
[General Appearance - Well Developed] : well developed [Normal Appearance] : normal appearance [Well Groomed] : well groomed [General Appearance - Well Nourished] : well nourished [No Deformities] : no deformities [General Appearance - In No Acute Distress] : no acute distress [Normal Conjunctiva] : the conjunctiva exhibited no abnormalities [Eyelids - No Xanthelasma] : the eyelids demonstrated no xanthelasmas [Normal Oral Mucosa] : normal oral mucosa [No Oral Pallor] : no oral pallor [No Oral Cyanosis] : no oral cyanosis [Normal Jugular Venous A Waves Present] : normal jugular venous A waves present [Normal Jugular Venous V Waves Present] : normal jugular venous V waves present [No Jugular Venous Zhang A Waves] : no jugular venous zhang A waves [Respiration, Rhythm And Depth] : normal respiratory rhythm and effort [Exaggerated Use Of Accessory Muscles For Inspiration] : no accessory muscle use [Auscultation Breath Sounds / Voice Sounds] : lungs were clear to auscultation bilaterally [Heart Rate And Rhythm] : heart rate and rhythm were normal [Heart Sounds] : normal S1 and S2 [Murmurs] : no murmurs present [Abdomen Soft] : soft [Abdomen Tenderness] : non-tender [Abnormal Walk] : normal gait [Abdomen Mass (___ Cm)] : no abdominal mass palpated [Gait - Sufficient For Exercise Testing] : the gait was sufficient for exercise testing [Nail Clubbing] : no clubbing of the fingernails [Cyanosis, Localized] : no localized cyanosis [Petechial Hemorrhages (___cm)] : no petechial hemorrhages [Skin Color & Pigmentation] : normal skin color and pigmentation [] : no rash [No Venous Stasis] : no venous stasis [Skin Lesions] : no skin lesions [No Skin Ulcers] : no skin ulcer [No Xanthoma] : no  xanthoma was observed [Oriented To Time, Place, And Person] : oriented to person, place, and time [Affect] : the affect was normal [Mood] : the mood was normal [No Anxiety] : not feeling anxious

## 2019-09-08 NOTE — DISCUSSION/SUMMARY
[FreeTextEntry1] : s/p ant MI - LAD PCI. Additional LCx disease - not significant on reassessment (repeat cath)\par \par [employer] meds\par No angina\par No HF\par \par Encouraged continued exercise and med compliance

## 2019-09-08 NOTE — HISTORY OF PRESENT ILLNESS
[FreeTextEntry1] : Ezequiel is returning \par He was a late presentation anterior wall - underwent PCI to the LAD, required a IABP post procedure\par Recent MRI revealed an EF of 39%\par Has returned to exercise and has worked with a  without limitations\par \par No palpitations\par No Orthopnea\par No syncope\par No bleeding\par No LE edema\par \par He remains very interested in stopping medications and asked multiple times to stop certain meds\par \par \par

## 2019-10-08 NOTE — H&P ADULT - REASON FOR ADMISSION
Sleep apnea stable at this time continue to use CPAP clean equipment as needed change equipment every 6 months intermittent chest pain for 3 days

## 2019-10-21 ENCOUNTER — RX RENEWAL (OUTPATIENT)
Age: 54
End: 2019-10-21

## 2019-11-16 NOTE — PATIENT PROFILE ADULT. - DOES PATIENT HAVE ADVANCE DIRECTIVE
No Itraconazole Counseling:  I discussed with the patient the risks of itraconazole including but not limited to liver damage, nausea/vomiting, neuropathy, and severe allergy.  The patient understands that this medication is best absorbed when taken with acidic beverages such as non-diet cola or ginger ale.  The patient understands that monitoring is required including baseline LFTs and repeat LFTs at intervals.  The patient understands that they are to contact us or the primary physician if concerning signs are noted.

## 2020-01-28 ENCOUNTER — RX CHANGE (OUTPATIENT)
Age: 55
End: 2020-01-28

## 2020-03-04 ENCOUNTER — APPOINTMENT (OUTPATIENT)
Dept: CARDIOLOGY | Facility: CLINIC | Age: 55
End: 2020-03-04
Payer: COMMERCIAL

## 2020-03-04 ENCOUNTER — NON-APPOINTMENT (OUTPATIENT)
Age: 55
End: 2020-03-04

## 2020-03-04 VITALS
HEART RATE: 52 BPM | DIASTOLIC BLOOD PRESSURE: 78 MMHG | SYSTOLIC BLOOD PRESSURE: 118 MMHG | BODY MASS INDEX: 26.55 KG/M2 | HEIGHT: 72 IN | OXYGEN SATURATION: 98 % | WEIGHT: 196 LBS

## 2020-03-04 PROCEDURE — 99214 OFFICE O/P EST MOD 30 MIN: CPT

## 2020-03-04 PROCEDURE — 93000 ELECTROCARDIOGRAM COMPLETE: CPT

## 2020-03-07 NOTE — HISTORY OF PRESENT ILLNESS
[FreeTextEntry1] : Ezequiel is returning \par He was a late presentation anterior wall - underwent PCI to the LAD, required a IABP post procedure\par Recent MRI revealed an EF of 39%\par Has continued to exercise and has worked with a  without limitations\par \par No palpitations\par No Orthopnea\par No syncope\par No bleeding\par No LE edema\par \par He remains very interested in stopping medications and asked multiple times to stop certain meds\par \par \par

## 2020-03-07 NOTE — DISCUSSION/SUMMARY
[FreeTextEntry1] : s/p ant MI - LAD PCI. Additional LCx disease - not significant on reassessment (repeat cath)\par \par Bing meds (Reluctant to allow for increase in dose)\par No angina\par No HF\par Check labs\par Encouraged continued exercise and med compliance

## 2020-03-17 LAB
ALBUMIN SERPL ELPH-MCNC: 4.7 G/DL
ALP BLD-CCNC: 76 U/L
ALT SERPL-CCNC: 19 U/L
ANION GAP SERPL CALC-SCNC: 12 MMOL/L
AST SERPL-CCNC: 26 U/L
BILIRUB SERPL-MCNC: 0.5 MG/DL
BUN SERPL-MCNC: 13 MG/DL
CALCIUM SERPL-MCNC: 9.7 MG/DL
CHLORIDE SERPL-SCNC: 105 MMOL/L
CHOLEST SERPL-MCNC: 119 MG/DL
CHOLEST/HDLC SERPL: 2.1 RATIO
CO2 SERPL-SCNC: 29 MMOL/L
CREAT SERPL-MCNC: 1.01 MG/DL
ESTIMATED AVERAGE GLUCOSE: 114 MG/DL
GLUCOSE SERPL-MCNC: 103 MG/DL
HBA1C MFR BLD HPLC: 5.6 %
HDLC SERPL-MCNC: 58 MG/DL
LDLC SERPL CALC-MCNC: 52 MG/DL
POTASSIUM SERPL-SCNC: 5.2 MMOL/L
PROT SERPL-MCNC: 6.7 G/DL
SODIUM SERPL-SCNC: 145 MMOL/L
TRIGL SERPL-MCNC: 47 MG/DL
TSH SERPL-ACNC: 1.58 UIU/ML

## 2020-04-22 ENCOUNTER — RX RENEWAL (OUTPATIENT)
Age: 55
End: 2020-04-22

## 2020-06-09 ENCOUNTER — INPATIENT (INPATIENT)
Facility: HOSPITAL | Age: 55
LOS: 1 days | Discharge: HOME | DRG: 287 | End: 2020-06-11
Attending: STUDENT IN AN ORGANIZED HEALTH CARE EDUCATION/TRAINING PROGRAM | Admitting: HOSPITALIST
Payer: COMMERCIAL

## 2020-06-09 VITALS
DIASTOLIC BLOOD PRESSURE: 79 MMHG | RESPIRATION RATE: 18 BRPM | OXYGEN SATURATION: 96 % | TEMPERATURE: 98 F | SYSTOLIC BLOOD PRESSURE: 126 MMHG | HEIGHT: 74.5 IN | WEIGHT: 197.09 LBS | HEART RATE: 47 BPM

## 2020-06-09 DIAGNOSIS — Z02.9 ENCOUNTER FOR ADMINISTRATIVE EXAMINATIONS, UNSPECIFIED: ICD-10-CM

## 2020-06-09 DIAGNOSIS — Z95.5 PRESENCE OF CORONARY ANGIOPLASTY IMPLANT AND GRAFT: Chronic | ICD-10-CM

## 2020-06-09 DIAGNOSIS — R00.1 BRADYCARDIA, UNSPECIFIED: ICD-10-CM

## 2020-06-09 DIAGNOSIS — I20.0 UNSTABLE ANGINA: ICD-10-CM

## 2020-06-09 DIAGNOSIS — R07.9 CHEST PAIN, UNSPECIFIED: ICD-10-CM

## 2020-06-09 DIAGNOSIS — I25.5 ISCHEMIC CARDIOMYOPATHY: ICD-10-CM

## 2020-06-09 DIAGNOSIS — Z29.9 ENCOUNTER FOR PROPHYLACTIC MEASURES, UNSPECIFIED: ICD-10-CM

## 2020-06-09 LAB
ALBUMIN SERPL ELPH-MCNC: 4.8 G/DL — SIGNIFICANT CHANGE UP (ref 3.3–5)
ALP SERPL-CCNC: 93 U/L — SIGNIFICANT CHANGE UP (ref 40–120)
ALT FLD-CCNC: 25 U/L — SIGNIFICANT CHANGE UP (ref 10–45)
ANION GAP SERPL CALC-SCNC: 10 MMOL/L — SIGNIFICANT CHANGE UP (ref 5–17)
APTT BLD: 35.7 SEC — SIGNIFICANT CHANGE UP (ref 27.5–36.3)
AST SERPL-CCNC: 28 U/L — SIGNIFICANT CHANGE UP (ref 10–40)
BILIRUB SERPL-MCNC: 0.4 MG/DL — SIGNIFICANT CHANGE UP (ref 0.2–1.2)
BUN SERPL-MCNC: 13 MG/DL — SIGNIFICANT CHANGE UP (ref 7–23)
CALCIUM SERPL-MCNC: 9.4 MG/DL — SIGNIFICANT CHANGE UP (ref 8.4–10.5)
CHLORIDE SERPL-SCNC: 99 MMOL/L — SIGNIFICANT CHANGE UP (ref 96–108)
CK MB BLD-MCNC: 2.6 % — SIGNIFICANT CHANGE UP (ref 0–3.5)
CK MB CFR SERPL CALC: 3.3 NG/ML — SIGNIFICANT CHANGE UP (ref 0–6.7)
CK SERPL-CCNC: 126 U/L — SIGNIFICANT CHANGE UP (ref 30–200)
CO2 SERPL-SCNC: 29 MMOL/L — SIGNIFICANT CHANGE UP (ref 22–31)
CREAT SERPL-MCNC: 1.04 MG/DL — SIGNIFICANT CHANGE UP (ref 0.5–1.3)
GLUCOSE SERPL-MCNC: 92 MG/DL — SIGNIFICANT CHANGE UP (ref 70–99)
HCT VFR BLD CALC: 44.5 % — SIGNIFICANT CHANGE UP (ref 39–50)
HGB BLD-MCNC: 15.3 G/DL — SIGNIFICANT CHANGE UP (ref 13–17)
INR BLD: 0.98 RATIO — SIGNIFICANT CHANGE UP (ref 0.88–1.16)
MCHC RBC-ENTMCNC: 31.9 PG — SIGNIFICANT CHANGE UP (ref 27–34)
MCHC RBC-ENTMCNC: 34.4 GM/DL — SIGNIFICANT CHANGE UP (ref 32–36)
MCV RBC AUTO: 92.9 FL — SIGNIFICANT CHANGE UP (ref 80–100)
NRBC # BLD: 0 /100 WBCS — SIGNIFICANT CHANGE UP (ref 0–0)
NT-PROBNP SERPL-SCNC: 266 PG/ML — SIGNIFICANT CHANGE UP (ref 0–300)
PLATELET # BLD AUTO: 250 K/UL — SIGNIFICANT CHANGE UP (ref 150–400)
POTASSIUM SERPL-MCNC: 4.1 MMOL/L — SIGNIFICANT CHANGE UP (ref 3.5–5.3)
POTASSIUM SERPL-SCNC: 4.1 MMOL/L — SIGNIFICANT CHANGE UP (ref 3.5–5.3)
PROT SERPL-MCNC: 7.8 G/DL — SIGNIFICANT CHANGE UP (ref 6–8.3)
PROTHROM AB SERPL-ACNC: 11.2 SEC — SIGNIFICANT CHANGE UP (ref 10–12.9)
RBC # BLD: 4.79 M/UL — SIGNIFICANT CHANGE UP (ref 4.2–5.8)
RBC # FLD: 12.9 % — SIGNIFICANT CHANGE UP (ref 10.3–14.5)
SARS-COV-2 RNA SPEC QL NAA+PROBE: SIGNIFICANT CHANGE UP
SODIUM SERPL-SCNC: 138 MMOL/L — SIGNIFICANT CHANGE UP (ref 135–145)
TROPONIN T, HIGH SENSITIVITY RESULT: 11 NG/L — SIGNIFICANT CHANGE UP (ref 0–51)
TROPONIN T, HIGH SENSITIVITY RESULT: 9 NG/L — SIGNIFICANT CHANGE UP (ref 0–51)
WBC # BLD: 7.13 K/UL — SIGNIFICANT CHANGE UP (ref 3.8–10.5)
WBC # FLD AUTO: 7.13 K/UL — SIGNIFICANT CHANGE UP (ref 3.8–10.5)

## 2020-06-09 PROCEDURE — 99285 EMERGENCY DEPT VISIT HI MDM: CPT

## 2020-06-09 PROCEDURE — 71045 X-RAY EXAM CHEST 1 VIEW: CPT | Mod: 26

## 2020-06-09 PROCEDURE — 99223 1ST HOSP IP/OBS HIGH 75: CPT

## 2020-06-09 PROCEDURE — 93010 ELECTROCARDIOGRAM REPORT: CPT

## 2020-06-09 PROCEDURE — 99233 SBSQ HOSP IP/OBS HIGH 50: CPT | Mod: GC

## 2020-06-09 RX ORDER — ASPIRIN/CALCIUM CARB/MAGNESIUM 324 MG
81 TABLET ORAL DAILY
Refills: 0 | Status: DISCONTINUED | OUTPATIENT
Start: 2020-06-10 | End: 2020-06-11

## 2020-06-09 RX ORDER — ASPIRIN/CALCIUM CARB/MAGNESIUM 324 MG
1 TABLET ORAL
Qty: 30 | Refills: 0

## 2020-06-09 RX ORDER — ATORVASTATIN CALCIUM 80 MG/1
1 TABLET, FILM COATED ORAL
Qty: 30 | Refills: 0

## 2020-06-09 RX ORDER — FAMOTIDINE 10 MG/ML
40 INJECTION INTRAVENOUS
Refills: 0 | Status: DISCONTINUED | OUTPATIENT
Start: 2020-06-09 | End: 2020-06-11

## 2020-06-09 RX ORDER — LOSARTAN POTASSIUM 100 MG/1
1 TABLET, FILM COATED ORAL
Qty: 0 | Refills: 0 | DISCHARGE

## 2020-06-09 RX ORDER — METOPROLOL TARTRATE 50 MG
1 TABLET ORAL
Qty: 0 | Refills: 0 | DISCHARGE

## 2020-06-09 RX ORDER — ATORVASTATIN CALCIUM 80 MG/1
80 TABLET, FILM COATED ORAL AT BEDTIME
Refills: 0 | Status: DISCONTINUED | OUTPATIENT
Start: 2020-06-09 | End: 2020-06-11

## 2020-06-09 RX ORDER — FAMOTIDINE 10 MG/ML
20 INJECTION INTRAVENOUS ONCE
Refills: 0 | Status: COMPLETED | OUTPATIENT
Start: 2020-06-09 | End: 2020-06-09

## 2020-06-09 RX ORDER — ENOXAPARIN SODIUM 100 MG/ML
40 INJECTION SUBCUTANEOUS DAILY
Refills: 0 | Status: DISCONTINUED | OUTPATIENT
Start: 2020-06-09 | End: 2020-06-11

## 2020-06-09 RX ORDER — ASPIRIN/CALCIUM CARB/MAGNESIUM 324 MG
243 TABLET ORAL ONCE
Refills: 0 | Status: COMPLETED | OUTPATIENT
Start: 2020-06-09 | End: 2020-06-09

## 2020-06-09 RX ORDER — LOSARTAN POTASSIUM 100 MG/1
25 TABLET, FILM COATED ORAL DAILY
Refills: 0 | Status: DISCONTINUED | OUTPATIENT
Start: 2020-06-10 | End: 2020-06-11

## 2020-06-09 RX ADMIN — Medication 243 MILLIGRAM(S): at 16:46

## 2020-06-09 RX ADMIN — ATORVASTATIN CALCIUM 80 MILLIGRAM(S): 80 TABLET, FILM COATED ORAL at 22:32

## 2020-06-09 RX ADMIN — FAMOTIDINE 20 MILLIGRAM(S): 10 INJECTION INTRAVENOUS at 19:03

## 2020-06-09 NOTE — ED ADULT NURSE REASSESSMENT NOTE - NS ED NURSE REASSESS COMMENT FT1
report received from TERRY Page. Pt A&oX4.  VSS. call bell in place, pt aware of plan of care. will continue to monitor.

## 2020-06-09 NOTE — H&P ADULT - NSHPSOCIALHISTORY_GEN_ALL_CORE
Social History:        Lives with: ( x ) alone  (  ) children   (  ) spouse   (  ) parents  (  ) other    Substance Use (street drugs): ( x ) never used  (  ) other:  Tobacco Usage:  (   ) never smoked   (  x ) former smoker   (   ) current smoker  (     ) pack year  (     quit 3 yrs ago   ) last cigarette date  Alcohol Usage: occasional wine with dinner

## 2020-06-09 NOTE — H&P ADULT - PROBLEM SELECTOR PLAN 2
-apparently baseline bradycardia; currently asx, denies LH/dizziness/syncope/sob  -monitor on tele  -temp holding home toprol 25 given bradycardia to 40s.   -if becomes symptomatic, would give atropine and get repeat eval from cardiology -apparently baseline bradycardia, as VS from allscripts suggest his usual HR is 50-60; currently asx, denies LH/dizziness/syncope/sob  -monitor on tele  -temp holding home toprol 25 given bradycardia to 40s. can restart in am if HR consistently >55  -if becomes symptomatic, would give atropine and get repeat eval from cardiology

## 2020-06-09 NOTE — H&P ADULT - NSHPREVIEWOFSYSTEMS_GEN_ALL_CORE
REVIEW OF SYSTEMS:  CONSTITUTIONAL: No weakness. No fevers. No chills. No weight loss. Good appetite.  EYES: No visual changes. No eye pain.  ENT: No hearing difficulty. No vertigo. No dysphagia. No Sinusitis/rhinorrhea.  NECK: No pain. No stiffness/rigidity.  CARDIAC: +chest burning pain. No palpitations.  RESPIRATORY: No cough. No SOB. No hemoptysis.  GASTROINTESTINAL: No abdominal pain. No nausea. No vomiting. No hematemesis. No diarrhea. No constipation. No melena. No hematochezia.  GENITOURINARY: No dysuria. No frequency. No hesitancy. No hematuria.  NEUROLOGICAL: No numbness. No focal weakness. No incontinence. No headache.  BACK: No back pain.  EXTREMITIES: No lower extremity edema. Full ROM.  SKIN: No rashes. No itching. No other lesions.  PSYCHIATRIC: No depression. No anxiety. No SI/HI.  ALLERGIC: No lip swelling. No hives.  All other review of systems is negative unless indicated above.

## 2020-06-09 NOTE — H&P ADULT - PROBLEM SELECTOR PLAN 1
-somewhat atypical chest pain at rest that more resembles GERD, but given prior history, smoking status, and EKG changes will need further workup  -admit to medicine with tele  -initial HST 11; trend. bnp wnl.   -check TTE  -cards recs appreciated: plan for LHC in am. covid19 neg  -s/p aspirin load; c/w aspirin 81 qd, losartan 25, and atorva 80.  hold toprol 25 for now given bradycardia to 40s on arrival.   -pt endorsing relief with pepcid, start bid. also will order maalox prn

## 2020-06-09 NOTE — H&P ADULT - NSHPPHYSICALEXAM_GEN_ALL_CORE
PHYSICAL EXAM:    Vital Signs Last 24 Hrs  T(C): 36.4 (09 Jun 2020 19:48), Max: 36.7 (09 Jun 2020 16:29)  T(F): 97.5 (09 Jun 2020 19:48), Max: 98.1 (09 Jun 2020 16:29)  HR: 43 (09 Jun 2020 19:48) (43 - 52)  BP: 127/74 (09 Jun 2020 19:48) (115/77 - 127/74)  BP(mean): --  RR: 18 (09 Jun 2020 19:48) (16 - 18)  SpO2: 100% (09 Jun 2020 19:48) (96% - 100%)    GENERAL: NAD, well-groomed, well-developed  HEAD:  Atraumatic, Normocephalic  EYES: EOMI, PERRLA, conjunctiva and sclera clear  ENMT: No tonsillar erythema, exudates, or enlargement; Moist mucous membranes, Good dentition, No lesions  NECK: Supple, No JVD, Normal thyroid  CHEST/LUNG: Clear to percussion bilaterally; No rales, rhonchi, wheezing, or rubs no resp distress or acc muscle usage.  HEART: Regular rate and rhythm; No murmurs, rubs, or gallops, no sig Le edema.  ABDOMEN: Soft, Nontender, Nondistended; Bowel sounds present, no rebound/guarding  EXTREMITIES:  2+ Peripheral Pulses, No clubbing, cyanosis  LYMPH: No lymphadenopathy noted, no lymphangitis  SKIN: No rashes or lesions, no petechiae  NERVOUS SYSTEM:  Alert & Oriented X3, Good concentration; Motor Strength 5/5 B/L upper and lower extremities, no facial droop or dysarthria.  Psych: normal affect, no si no hi

## 2020-06-09 NOTE — CONSULT NOTE ADULT - SUBJECTIVE AND OBJECTIVE BOX
Patient seen and evaluated at bedside    Chief Complaint: CP    HPI: 53 y/o M w/ PMH of HLD, former tobacco, and CAD/AWSTEMI (2017) s/p EZRA x 3 to LAD c/o CP x 1D. Pt states he was in his USOH when he developed epigastric/substernal burning. He is not aware of any triggers, although notes he had shrimp and wine, which is an unusual dinner for him. Pain was radiating up his chest and into his throat/jaw. He also notes chest tightness at this time, which he describes as "like having a bubble in my chest". He denies any associated N/V. No dyspnea. No palpitations. No diaphoresis. No presyncope/syncope. He attempted to sleep off sxs, but had persistent sxs upon waking. He went to [facility] who advised him he had abnml ECG; he called his cardiologist who advised him to go to ED. Pt reports resolution of CP shortly prior to my interview, but unclear what relieved his pain. He notes his sxs are similar in quality to his previous MI.      PMHx:   STEMI (ST elevation myocardial infarction)  HLD (hyperlipidemia)  No pertinent past medical history      PSHx:   S/P drug eluting coronary stent placement  No significant past surgical history      Allergies:  No Known Allergies      Home Meds: Reviewed    Current Medications:   famotidine Injectable 20 milliGRAM(s) IV Push Once      FAMILY HISTORY:  No pertinent family history in first degree relatives      Social History:  Smoking History: denies  Alcohol Use: denies  Drug Use: denies    REVIEW OF SYSTEMS:  CONSTITUTIONAL: No weakness, fevers or chills  EYES/ENT: No visual changes;  No dysphagia  NECK: No pain or stiffness  RESPIRATORY: No cough, wheezing, hemoptysis; No shortness of breath  CARDIOVASCULAR: +CP  GASTROINTESTINAL: No abdominal or epigastric pain. No nausea, vomiting, or hematemesis; No diarrhea or constipation. No melena or hematochezia.  BACK: No back pain  GENITOURINARY: No dysuria, frequency or hematuria  NEUROLOGICAL: No numbness or weakness  SKIN: No itching, burning, rashes, or lesions   All other review of systems is negative unless indicated above.    Physical Exam:  T(F): 97.8 (06-09), Max: 98.1 (06-09)  HR: 50 (06-09) (47 - 50)  BP: 120/67 (06-09) (120/67 - 126/79)  RR: 18 (06-09)  SpO2: 98% (06-09)  Gen: NAD.  HEENT: NCAT. PERRLA b/l.  Neck: No JVP elev.  CV: Normal S1, S2. RRR. No MRG.  Chest: CTAB. No WRR.  Abd: +BSx4. Soft. NTND.  Ext: No LE edema.  Skin: No cyanosis.    Cardiovascular Diagnostic Testing:    ECG: Personally reviewed: Sinus kriss. QW in anteroseptal leads.    Echo: Personally reviewed: < from: TTE with Doppler (w/Cont) (02.21.18 @ 12:59) >  1. Normal trileaflet aortic valve.  2. Eccentric left ventricular hypertrophy (dilated left  ventricle with normal relative wall thickness).  3. Severe segmental left ventricular systolic dysfunction.  Endocardial visualization enhanced with intravenous  injection of echo contrast (Definity). No left ventricular  thrombus. The septum, distal inferior wall and apex are  hypokinetic. The anterior wall is not wall seen even with  Definity but the mid todistal segments appear hypokinetic.    4. Normal right ventricular size and function.    Stress Testing:     Cath: < from: Cardiac Cath Lab - Adult (10.27.17 @ 11:05) >  CORONARY VESSELS: The coronary circulation is right dominant.  LM:   --  LM: Normal.  LAD:   --  Proximal LAD: There was a diffuse 10 % stenosis at the site of a  prior stent, in-stent.  --  Mid LAD: There was a tubular 10 % stenosis at the site of a prior  stent, in-stent.  CX:   --  Proximal circumflex: Normal.  --  OM2: There was a 50 % stenosis.  RCA:   --  Proximal RCA: There was a 100 % stenosis.  COMPLICATIONS: There were no complications.  DIAGNOSTIC IMPRESSIONS: Patent LAD stents. Mod OM2 disease. Occluded RCA  with collaterals. Elevated LVEDP    Imaging:    CXR: Personally reviewed. Clear lungs.    Labs: Personally reviewed                        15.3   7.13  )-----------( 250      ( 09 Jun 2020 17:05 )             44.5     06-09    138  |  99  |  13  ----------------------------<  92  4.1   |  29  |  1.04    Ca    9.4      09 Jun 2020 17:05    TPro  7.8  /  Alb  4.8  /  TBili  0.4  /  DBili  x   /  AST  28  /  ALT  25  /  AlkPhos  93  06-09    PT/INR - ( 09 Jun 2020 17:05 )   PT: 11.2 sec;   INR: 0.98 ratio         PTT - ( 09 Jun 2020 17:05 )  PTT:35.7 sec    CARDIAC MARKERS ( 09 Jun 2020 17:05 )  11 ng/L / x     / x     / x     / x     / x            Serum Pro-Brain Natriuretic Peptide: 266 pg/mL (06-09 @ 17:05)

## 2020-06-09 NOTE — CONSULT NOTE ADULT - ASSESSMENT
55 y/o M w/ PMH of HLD, former tobacco, and CAD/AWSTEMI (2017) s/p EZRA x 3 to LAD c/o CP x 1D.    #CP  -Unclear etiology, but concern for cardiac source given description and known clinical hx  -ECG reviewed; unremarkable and unchanged since baseline  -Trend Dana incl CK/CKMB over next 24 hrs; notify cardiology if elevated or positive delta  -Notify cardiology if CP recurs  -C/w home med ASA  -C/w home med metop  -Check TTE in AM  -Given concern for cardiac/ischemic cause of CP, would pursue ischemia eval w/ C tomorrow w/ Dr. German  -Otherwise, consider GI source of pain/discomfort    #ICMP/HFrEF  -C/w home med metop  -C/w home med losartan  -Appears well compensated currently  -Is/Os, daily wts    Sivakumar Shook MD  Cardiology Fellow  [phone]  All Cardiology service information can be found 24/7 on [email or web address], password: [email or web address]

## 2020-06-09 NOTE — ED PROVIDER NOTE - CARDIAC, MLM
bradycardic, regular rhythm.  Heart sounds S1, S2.  No murmurs, rubs or gallops. 2+ pulses in distal extremities b/l

## 2020-06-09 NOTE — ED PROVIDER NOTE - ATTENDING CONTRIBUTION TO CARE
53yo male pmh HLD, CAD s/p stents x 3 on aspirin p/w "heart burn" and chest pressure since last night after eating sushi, intermittent, no modifying factors. Denies dyspnea, cough, fevers, LE swelling or calf pain, abd pain, n/v/d, rash, travel. Last stent 3y ago. Sent in by Dr. German. Exam unremarkable. EKG sinus bradycardia, IRIS 1mm V3-4. Cards consulted for chest pain with concerning history, will cath tmrw. Labs with trop. GI meds. Aspirin (took 81mg this morning). Outpatient EKG this morning with IRIS V2-4 1mm

## 2020-06-09 NOTE — ED PROVIDER NOTE - OBJECTIVE STATEMENT
53yo male pmh HLD, CAD s/p stents on asa p/w cp. Patient reports heart burn started last night while eating sushi. Non radiating and no exacerbating/relieving symptoms. States the last time he had an MI, it started with a similar burning feeling. Denies sob, n/v/diaphoresis, leg swelling. Saw Dr. German today who sent him in for a cardiac cath.

## 2020-06-09 NOTE — ED ADULT NURSE NOTE - OBJECTIVE STATEMENT
54 y female history of STEMI and HLD presents from home with chest pressure with "heartburn feeling" beginning last night after eating. Patient reports to STEMI in past to be same symptoms; Cardiologist reports to come to ED for evaluation. Patient denies SOB, N/V/D, lightheadedness, dizziness. A&Ox3. Skin warm dry and intact. Breathing comfortably in bed- no distress. Abdomen soft, nontender, nondistended. Safety maintained- bed locked in lowest position. 2 18 G IVs placed bilaterally. VSS- patient baseline bradycardic; 50s on monitor.

## 2020-06-09 NOTE — ED PROVIDER NOTE - CLINICAL SUMMARY MEDICAL DECISION MAKING FREE TEXT BOX
53yo male pmh HLD, CAD s/p stents on asa p/w cp. patient is well appearing and vss. no acute STEMI but will workup for ACS. patient's baseline is sinus kriss and currently asymptomatic. Will get cards consult, admit for cath.

## 2020-06-09 NOTE — H&P ADULT - HISTORY OF PRESENT ILLNESS
54 M PMH CAD/AWSTEMI (2017) s/p EZRA x 3 to LAD, ICM, HLD, former tobacco p/w CP x 1 day.      VS: 97.5, 43, 127/74, 18, 100% Ra.  Labs: CBC unrevealing, coags unrevealing, cmp unrevealing, initial HST 11, probnp 266. Covid19 pcr neg. CXR prelim SAUNDRA. In ER pt received aspirin load, pepcid, prior to medicine team involvement. 54 M PMH CAD/AWSTEMI (2017) s/p EZRA x 3 to LAD, ICM, HLD, former tobacco p/w CP x 1 day.  Pt states that he had sushi/shrimp last night for dinner. Soon after he developed epigastric burning pain.  He notes that the sx have been intermittent since then. +associated chest tightness. He qualifies that the last time he had ACS presentation, it too was preceded by epigastric/heartburn type pain, but soon transitioned to actual sharp chest pain.  Contrary to ER documentation, he qualifies that currently he has had not associated sharp chest pain.  Denies sob, spears, LE edema, orthopnea, palpitations, light headedness, dizziness, ha/visual changes, bruising/bleeding/melena/hematochezia, n/v/d, myalgias. Pt notes that since his WFH/quarantine has began in last few months, he feels he is much fitter than his baseline, and that he has been exercising daily; denies dec in exercise tolerance. Compliant with meds, took daily asa 81 in am. As his sx persisted in intermittent fashion overnight til today, he went to [facility] where he had an abnormal EKG; called Dr. German' office, and was referred to ER.  Sx have overall mostly resolved while in the ER.     VS: 97.5, 43, 127/74, 18, 100% Ra.  Labs: CBC unrevealing, coags unrevealing, cmp unrevealing, initial HST 11, probnp 266. Covid19 pcr neg. CXR prelim SAUNDRA. In ER pt received aspirin load, pepcid, prior to medicine team involvement.

## 2020-06-09 NOTE — H&P ADULT - PROBLEM SELECTOR PLAN 3
-generally euvolemic; no peripheral edema on exam, no sob, CXR clear, probnp nml.   -c/w arb  -temp holding bb given bradycardia

## 2020-06-09 NOTE — CONSULT NOTE ADULT - ATTENDING COMMENTS
Agree with plan as outlined above.  Case d/w Dr. German  Possible [facility] tomorrow

## 2020-06-09 NOTE — H&P ADULT - NSHPLABSRESULTS_GEN_ALL_CORE
Labs personally reviewed : CBC unrevealing, coags unrevealing, cmp unrevealing, initial HST 11, probnp 266. Covid19 pcr neg.   Imaging personally reviewed CXR prelim SAUNDRA.  EKG personally reviewed sinus kriss anteroseptal qw

## 2020-06-10 ENCOUNTER — TRANSCRIPTION ENCOUNTER (OUTPATIENT)
Age: 55
End: 2020-06-10

## 2020-06-10 LAB
ALBUMIN SERPL ELPH-MCNC: 4 G/DL — SIGNIFICANT CHANGE UP (ref 3.3–5)
ALP SERPL-CCNC: 70 U/L — SIGNIFICANT CHANGE UP (ref 40–120)
ALT FLD-CCNC: 18 U/L — SIGNIFICANT CHANGE UP (ref 10–45)
ANION GAP SERPL CALC-SCNC: 11 MMOL/L — SIGNIFICANT CHANGE UP (ref 5–17)
AST SERPL-CCNC: 24 U/L — SIGNIFICANT CHANGE UP (ref 10–40)
BASOPHILS # BLD AUTO: 0.02 K/UL — SIGNIFICANT CHANGE UP (ref 0–0.2)
BASOPHILS NFR BLD AUTO: 0.3 % — SIGNIFICANT CHANGE UP (ref 0–2)
BILIRUB SERPL-MCNC: 0.5 MG/DL — SIGNIFICANT CHANGE UP (ref 0.2–1.2)
BUN SERPL-MCNC: 12 MG/DL — SIGNIFICANT CHANGE UP (ref 7–23)
CALCIUM SERPL-MCNC: 9 MG/DL — SIGNIFICANT CHANGE UP (ref 8.4–10.5)
CHLORIDE SERPL-SCNC: 104 MMOL/L — SIGNIFICANT CHANGE UP (ref 96–108)
CO2 SERPL-SCNC: 26 MMOL/L — SIGNIFICANT CHANGE UP (ref 22–31)
CREAT SERPL-MCNC: 0.97 MG/DL — SIGNIFICANT CHANGE UP (ref 0.5–1.3)
EOSINOPHIL # BLD AUTO: 0.18 K/UL — SIGNIFICANT CHANGE UP (ref 0–0.5)
EOSINOPHIL NFR BLD AUTO: 2.8 % — SIGNIFICANT CHANGE UP (ref 0–6)
GLUCOSE SERPL-MCNC: 88 MG/DL — SIGNIFICANT CHANGE UP (ref 70–99)
HCT VFR BLD CALC: 42.8 % — SIGNIFICANT CHANGE UP (ref 39–50)
HGB BLD-MCNC: 14.2 G/DL — SIGNIFICANT CHANGE UP (ref 13–17)
IMM GRANULOCYTES NFR BLD AUTO: 0.3 % — SIGNIFICANT CHANGE UP (ref 0–1.5)
LYMPHOCYTES # BLD AUTO: 2.11 K/UL — SIGNIFICANT CHANGE UP (ref 1–3.3)
LYMPHOCYTES # BLD AUTO: 32.3 % — SIGNIFICANT CHANGE UP (ref 13–44)
MAGNESIUM SERPL-MCNC: 2.1 MG/DL — SIGNIFICANT CHANGE UP (ref 1.6–2.6)
MCHC RBC-ENTMCNC: 30.8 PG — SIGNIFICANT CHANGE UP (ref 27–34)
MCHC RBC-ENTMCNC: 33.2 GM/DL — SIGNIFICANT CHANGE UP (ref 32–36)
MCV RBC AUTO: 92.8 FL — SIGNIFICANT CHANGE UP (ref 80–100)
MONOCYTES # BLD AUTO: 0.79 K/UL — SIGNIFICANT CHANGE UP (ref 0–0.9)
MONOCYTES NFR BLD AUTO: 12.1 % — SIGNIFICANT CHANGE UP (ref 2–14)
NEUTROPHILS # BLD AUTO: 3.41 K/UL — SIGNIFICANT CHANGE UP (ref 1.8–7.4)
NEUTROPHILS NFR BLD AUTO: 52.2 % — SIGNIFICANT CHANGE UP (ref 43–77)
NRBC # BLD: 0 /100 WBCS — SIGNIFICANT CHANGE UP (ref 0–0)
PHOSPHATE SERPL-MCNC: 4 MG/DL — SIGNIFICANT CHANGE UP (ref 2.5–4.5)
PLATELET # BLD AUTO: 240 K/UL — SIGNIFICANT CHANGE UP (ref 150–400)
POTASSIUM SERPL-MCNC: 4 MMOL/L — SIGNIFICANT CHANGE UP (ref 3.5–5.3)
POTASSIUM SERPL-SCNC: 4 MMOL/L — SIGNIFICANT CHANGE UP (ref 3.5–5.3)
PROT SERPL-MCNC: 6.1 G/DL — SIGNIFICANT CHANGE UP (ref 6–8.3)
RBC # BLD: 4.61 M/UL — SIGNIFICANT CHANGE UP (ref 4.2–5.8)
RBC # FLD: 12.8 % — SIGNIFICANT CHANGE UP (ref 10.3–14.5)
SODIUM SERPL-SCNC: 141 MMOL/L — SIGNIFICANT CHANGE UP (ref 135–145)
WBC # BLD: 6.53 K/UL — SIGNIFICANT CHANGE UP (ref 3.8–10.5)
WBC # FLD AUTO: 6.53 K/UL — SIGNIFICANT CHANGE UP (ref 3.8–10.5)

## 2020-06-10 PROCEDURE — 93306 TTE W/DOPPLER COMPLETE: CPT | Mod: 26

## 2020-06-10 PROCEDURE — 99233 SBSQ HOSP IP/OBS HIGH 50: CPT

## 2020-06-10 PROCEDURE — 99233 SBSQ HOSP IP/OBS HIGH 50: CPT | Mod: GC

## 2020-06-10 PROCEDURE — 93458 L HRT ARTERY/VENTRICLE ANGIO: CPT | Mod: 26

## 2020-06-10 PROCEDURE — 93010 ELECTROCARDIOGRAM REPORT: CPT

## 2020-06-10 PROCEDURE — 99152 MOD SED SAME PHYS/QHP 5/>YRS: CPT

## 2020-06-10 RX ADMIN — Medication 81 MILLIGRAM(S): at 11:24

## 2020-06-10 RX ADMIN — LOSARTAN POTASSIUM 25 MILLIGRAM(S): 100 TABLET, FILM COATED ORAL at 05:14

## 2020-06-10 RX ADMIN — ATORVASTATIN CALCIUM 80 MILLIGRAM(S): 80 TABLET, FILM COATED ORAL at 21:53

## 2020-06-10 NOTE — PROGRESS NOTE ADULT - SUBJECTIVE AND OBJECTIVE BOX
Patient is a 54y old  Male who presents with a chief complaint of CP (10 Kade 2020 11:13)      SUBJECTIVE / OVERNIGHT EVENTS:  no acute events overnight, vss, afebrile  pt continues to endorse some chest tightness this morning  wondering what's going on. wants to make sure its not another blockage    tele reviewed: sinus kriss 40-50s, down to 36    ROS:  14 point ROS negative in detail except stated as above    MEDICATIONS  (STANDING):  aspirin enteric coated 81 milliGRAM(s) Oral daily  atorvastatin 80 milliGRAM(s) Oral at bedtime  enoxaparin Injectable 40 milliGRAM(s) SubCutaneous daily  famotidine    Tablet 40 milliGRAM(s) Oral two times a day  losartan 25 milliGRAM(s) Oral daily    MEDICATIONS  (PRN):  aluminum hydroxide/magnesium hydroxide/simethicone Suspension 30 milliLiter(s) Oral every 4 hours PRN Dyspepsia      CAPILLARY BLOOD GLUCOSE        I&O's Summary      PHYSICAL EXAM:  Vital Signs Last 24 Hrs  T(C): 36.6 (10 Kdae 2020 11:53), Max: 36.7 (09 Jun 2020 16:29)  T(F): 97.8 (10 Kade 2020 11:53), Max: 98.1 (09 Jun 2020 16:29)  HR: 58 (10 Kade 2020 11:53) (43 - 58)  BP: 118/79 (10 Kade 2020 11:53) (113/72 - 127/74)  BP(mean): --  RR: 18 (10 Kade 2020 11:53) (16 - 18)  SpO2: 97% (10 Kade 2020 11:53) (94% - 100%)    GENERAL: NAD, well-developed  HEAD:  Atraumatic, Normocephalic  EYES: EOMI, PERRLA, conjunctiva and sclera clear  NECK: Supple, No JVD  CHEST/LUNG: Clear to auscultation bilaterally; No wheeze  HEART: bradycardia  ABDOMEN: Soft, Nontender, Nondistended; Bowel sounds present  EXTREMITIES:  2+ Peripheral Pulses, No clubbing, cyanosis, or edema  NEUROLOGY: AAOx3; non-focal  SKIN: No rashes or lesions    LABS:  personally reviewed                        14.2   6.53  )-----------( 240      ( 10 Kade 2020 06:36 )             42.8     06-10    141  |  104  |  12  ----------------------------<  88  4.0   |  26  |  0.97    Ca    9.0      10 Kade 2020 06:36  Phos  4.0     06-10  Mg     2.1     06-10    TPro  6.1  /  Alb  4.0  /  TBili  0.5  /  DBili  x   /  AST  24  /  ALT  18  /  AlkPhos  70  06-10    PT/INR - ( 09 Jun 2020 17:05 )   PT: 11.2 sec;   INR: 0.98 ratio         PTT - ( 09 Jun 2020 17:05 )  PTT:35.7 sec  CARDIAC MARKERS ( 09 Jun 2020 22:12 )  x     / x     / 126 U/L / x     / 3.3 ng/mL    Troponin T, High Sensitivity (06.10.20 @ 06:36)    Troponin T, High Sensitivity Result: 13:     RADIOLOGY & ADDITIONAL TESTS:    Imaging Personally Reviewed:  -TTE  < from: TTE with Doppler (w/Cont) (06.10.20 @ 07:29) >  Dimensions:    Normal Values:  LA:     3.6    2.0 - 4.0 cm  Ao:     3.2    2.0 - 3.8 cm  SEPTUM: 0.7    0.6 - 1.2 cm  PWT:    0.9    0.6 - 1.1 cm  LVIDd:  5.7    3.0 - 5.6 cm  LVIDs:  4.6    1.8 - 4.0 cm  Derived variables:  LVMI: 77 g/m2  RWT: 0.31  Fractional short: 19 %  EF (Visual Estimate): 35 %  Doppler Peak Velocity (m/sec): AoV=1.3  ------------------------------------------------------------------------  Observations:  Mitral Valve: Normal mitral valve. Minimal mitral  regurgitation.  Aortic Valve/Aorta: Calcified trileaflet aortic valve with  normal opening. Peak transaortic valve gradient equals 7 mm  Hg. No aortic valve regurgitation seen. Peak left  ventricular outflow tract gradient equals 4 mm Hg.  Aortic Root: 3.2 cm.  Left Atrium: Normal left atrium.  LA volume index = 25  cc/m2.  Left Ventricle: Endocardial visualization enhanced with  intravenous injection of Ultrasonic Enhancing Agent  (Definity). Moderate to severe segmental left ventricular  systolic dysfunction. The mid to distal septum, mid to  distal inferior, and apex are akinetic. No left ventricular  thrombus. Normal left ventricular internal dimensions and  wall thicknesses.  Right Heart: Normal right atrium. Normal right ventricular  size and function. Normal tricuspid valve. Minimal  tricuspid regurgitation. Pulmonic valve not well  visualized, probably normal. Minimal pulmonic  regurgitation.  Pericardium/Pleura: Normal pericardium with no pericardial  effusion.  Hemodynamic: Estimated right atrial pressure is 8 mm Hg.  Estimated right ventricular systolic pressure equals 22 mm  Hg, assuming right atrial pressure equals 8 mm Hg,  consistent with normal pulmonary pressures.  ------------------------------------------------------------------------  Conclusions:  1. Normal mitral valve. Minimal mitral regurgitation.  2. Calcified trileaflet aortic valve with normal opening.  No aortic valve regurgitation seen.  3. Endocardial visualization enhanced with intravenous  injection of Ultrasonic Enhancing Agent (Definity).  Moderate to severe segmental left ventricular systolic  dysfunction. The mid to distal septum, mid to distal  inferior, and apex are akinetic. No left ventricular  thrombus.  4. Normal right ventricular size and function.  *** Compared with echocardiogram of 2/21/2018, results are  similar on today's study.  ------------------------------------------------------------------------  Confirmed on  6/10/2020 - 11:06:42 by Lyndon Menchaca M.D.  ------------------------------------------------------------------------    < end of copied text >      Consultant(s) Notes Reviewed:  cardiology    Care Discussed with Consultants/Other Providers: Dr. Shook (cards)

## 2020-06-10 NOTE — PROGRESS NOTE ADULT - PROBLEM SELECTOR PLAN 1
persistent chest tightness  - trop negative x3, ekg without acute changes  - appreciate card recs: plan for LHC today  - TTE with LV dysfunction (35%), normal RV function  - continue telemetry

## 2020-06-10 NOTE — PROGRESS NOTE ADULT - ASSESSMENT
55 y/o M w/ PMH of HLD, former tobacco, and CAD/AWSTEMI (2017) s/p EZRA x 3 to LAD c/o CP x 1D.    #CP  -Suspect possible GI source; suspicion for ischemia as cause is currently lower  -Troponin normal range thus far; recheck value now  -Repeat ECG now; notify cardiology when complete  -TTE results reviewed; similar to prior w/ no new RWMA  -C/w home med ASA  -C/w home med metop  -Given normal troponin and lower suspicion for cardiac source, will defer LHC for now  -Pt should be arranged for close f/u w/ Dr. German after d/c  -Otherwise, consider GI source of pain/discomfort    #ICMP/HFrEF  -C/w home med metop  -C/w home med losartan  -Appears well compensated currently  -Is/Os, daily wts    #If trop/ECG WNL, suspect OK to d/c w/ close outpt cardiology f/u (within 48-72 hrs)    Sivakumar Shook MD  Cardiology Fellow  [phone]  All Cardiology service information can be found 24/7 on [email or web address], password: [email or web address] 55 y/o M w/ PMH of HLD, former tobacco, and CAD/AWSTEMI (2017) s/p EZRA x 3 to LAD c/o CP x 1D.    #CP  -Suspect possible GI source; suspicion for ischemia as cause is currently lower  -Troponin normal range thus far; recheck value now  -Repeat ECG now; notify cardiology when complete  -TTE results reviewed; similar to prior w/ no new RWMA  -C/w home med ASA  -C/w home med metop  -Given normal troponin and lower suspicion for cardiac source, will defer LHC for now  -Pt should be arranged for close f/u w/ Dr. German after d/c  -Otherwise, consider GI source of pain/discomfort  -Addendum: given recurrence of chest discomfort plan for LHC today w/ Dr. Brittany German ~5:30PM    #ICMP/HFrEF  -C/w home med metop  -C/w home med losartan  -Appears well compensated currently  -Is/Os, daily wts    #If trop/ECG WNL, suspect OK to d/c w/ close outpt cardiology f/u (within 48-72 hrs)    Sivakumar Shook MD  Cardiology Fellow  [phone]  All Cardiology service information can be found 24/7 on [email or web address], password: [email or web address]

## 2020-06-10 NOTE — DISCHARGE NOTE PROVIDER - NSDCCAREPROVSEEN_GEN_ALL_CORE_FT
[facility] Medicine, Advance PracticeTeam  Joleen Kate  [facility] Cardiology, Consults Teaching Sv

## 2020-06-10 NOTE — PROGRESS NOTE ADULT - PROBLEM SELECTOR PLAN 2
-apparently baseline bradycardia, as VS from allscripts suggest his usual HR is 50-60; currently asx, denies LH/dizziness/syncope/sob  -monitor on tele  -temp holding home toprol 25 given bradycardia to 40s

## 2020-06-10 NOTE — DISCHARGE NOTE PROVIDER - CARE PROVIDER_API CALL
Brittany German S  CARDIOVASCULAR DISEASE  [address] [place] [address]  Phone: [phone]  Fax: [phone]  Follow Up Time:

## 2020-06-10 NOTE — PROGRESS NOTE ADULT - SUBJECTIVE AND OBJECTIVE BOX
Patient seen and examined at bedside.    Overnight Events: This AM, pt states he had return of epigastric discomfort and some chest discomfort. Troponin returned normal range.    Review Of Systems: +Chest/epigastric discomfort.        Current Meds:  aluminum hydroxide/magnesium hydroxide/simethicone Suspension 30 milliLiter(s) Oral every 4 hours PRN  aspirin enteric coated 81 milliGRAM(s) Oral daily  atorvastatin 80 milliGRAM(s) Oral at bedtime  enoxaparin Injectable 40 milliGRAM(s) SubCutaneous daily  famotidine    Tablet 40 milliGRAM(s) Oral two times a day  losartan 25 milliGRAM(s) Oral daily      Vitals:  T(F): 97.7 (06-10), Max: 98.1 (06-09)  HR: 43 (06-10) (43 - 52)  BP: 118/75 (06-10) (113/72 - 127/74)  RR: 18 (06-10)  SpO2: 94% (06-10)  I&O's Summary      Physical Exam:  Gen: NAD.  HEENT: NCAT. PERRLA b/l.  Neck: No JVP elev.  CV: Normal S1, S2. RRR. No MRG.  Chest: CTAB. No WRR.  Abd: +BSx4. Soft. NTND.  Ext: No LE edema.  Skin: No cyanosis.                          14.2   6.53  )-----------( 240      ( 10 Kade 2020 06:36 )             42.8     06-10    141  |  104  |  12  ----------------------------<  88  4.0   |  26  |  0.97    Ca    9.0      10 Kade 2020 06:36  Phos  4.0     06-10  Mg     2.1     06-10    TPro  6.1  /  Alb  4.0  /  TBili  0.5  /  DBili  x   /  AST  24  /  ALT  18  /  AlkPhos  70  06-10    PT/INR - ( 09 Jun 2020 17:05 )   PT: 11.2 sec;   INR: 0.98 ratio         PTT - ( 09 Jun 2020 17:05 )  PTT:35.7 sec  CARDIAC MARKERS ( 09 Jun 2020 22:12 )  9 ng/L / x     / x     / 126 U/L / x     / 3.3 ng/mL  CARDIAC MARKERS ( 09 Jun 2020 17:05 )  11 ng/L / x     / x     / x     / x     / x          Serum Pro-Brain Natriuretic Peptide: 266 pg/mL (06-09 @ 17:05)    Echo: < from: TTE with Doppler (w/Cont) (06.10.20 @ 07:29) >  1. Normal mitral valve. Minimal mitral regurgitation.  2. Calcified trileaflet aortic valve with normal opening.  No aortic valve regurgitation seen.  3. Endocardial visualization enhanced with intravenous  injection of Ultrasonic Enhancing Agent (Definity).  Moderate to severe segmental left ventricular systolic  dysfunction. The mid to distal septum, mid to distal  inferior, and apex are akinetic. No left ventricular  thrombus.  4. Normal right ventricular size and function.    Interpretation of Telemetry: NSR/sinus kriss

## 2020-06-10 NOTE — DISCHARGE NOTE PROVIDER - NSDCCPCAREPLAN_GEN_ALL_CORE_FT
PRINCIPAL DISCHARGE DIAGNOSIS  Diagnosis: Chest pain  Assessment and Plan of Treatment: Diagnostic [facility] 6/10 no progression of disease; Follow up with Dr. German  Coronary artery disease is a condition where the arteries the supply the heart muscle get clogged with fatty deposits & puts you at risk for a heart attack  Call your doctor if you have any new pain, pressure, or discomfort in the center of your chest, pain, tingling or discomfort in arms, back, neck, jaw, or stomach, shortness of breath, nausea, vomiting, burping or heartburn, sweating, cold and clammy skin, racing or abnormal heartbeat for more than 10 minutes or if they keep coming & going.    You can help yourself with lefestyle changes (quitting smoking if you smoke), eat lots of fruits & vegetables & low fat dairy products, not a lot of meat & fatty foods, walk or some form of physical activity most days of the week, lose weight if you are overweight  Take your cardiac medication as prescribed  Make sure to keep appointments with doctor for cardiac follow up care

## 2020-06-10 NOTE — PROGRESS NOTE ADULT - ASSESSMENT
54 M PMH CAD/JUANI (2017) s/p EZRA x 3 to LAD, ICM, HLD, former tobacco p/w CP x 1 day, scheduled for LH today

## 2020-06-10 NOTE — DISCHARGE NOTE PROVIDER - NSDCMRMEDTOKEN_GEN_ALL_CORE_FT
aspirin 81 mg oral delayed release tablet: 1 tab(s) orally once a day  atorvastatin 80 mg oral tablet: 1 tab(s) orally once a day (at bedtime)  losartan 25 mg oral tablet: 1 tab(s) orally once a day  metoprolol succinate 25 mg oral tablet, extended release: 1 tab(s) orally once a day (in the evening)

## 2020-06-11 ENCOUNTER — TRANSCRIPTION ENCOUNTER (OUTPATIENT)
Age: 55
End: 2020-06-11

## 2020-06-11 VITALS — SYSTOLIC BLOOD PRESSURE: 121 MMHG | DIASTOLIC BLOOD PRESSURE: 76 MMHG | HEART RATE: 47 BPM

## 2020-06-11 PROCEDURE — 84100 ASSAY OF PHOSPHORUS: CPT

## 2020-06-11 PROCEDURE — 80053 COMPREHEN METABOLIC PANEL: CPT

## 2020-06-11 PROCEDURE — C8929: CPT

## 2020-06-11 PROCEDURE — 99232 SBSQ HOSP IP/OBS MODERATE 35: CPT | Mod: GC

## 2020-06-11 PROCEDURE — 84484 ASSAY OF TROPONIN QUANT: CPT

## 2020-06-11 PROCEDURE — 83880 ASSAY OF NATRIURETIC PEPTIDE: CPT

## 2020-06-11 PROCEDURE — 85610 PROTHROMBIN TIME: CPT

## 2020-06-11 PROCEDURE — 99285 EMERGENCY DEPT VISIT HI MDM: CPT | Mod: 25

## 2020-06-11 PROCEDURE — 71045 X-RAY EXAM CHEST 1 VIEW: CPT

## 2020-06-11 PROCEDURE — 99152 MOD SED SAME PHYS/QHP 5/>YRS: CPT

## 2020-06-11 PROCEDURE — C1769: CPT

## 2020-06-11 PROCEDURE — 93458 L HRT ARTERY/VENTRICLE ANGIO: CPT

## 2020-06-11 PROCEDURE — 85027 COMPLETE CBC AUTOMATED: CPT

## 2020-06-11 PROCEDURE — 83735 ASSAY OF MAGNESIUM: CPT

## 2020-06-11 PROCEDURE — 99239 HOSP IP/OBS DSCHRG MGMT >30: CPT

## 2020-06-11 PROCEDURE — C1894: CPT

## 2020-06-11 PROCEDURE — 93005 ELECTROCARDIOGRAM TRACING: CPT

## 2020-06-11 PROCEDURE — C1887: CPT

## 2020-06-11 PROCEDURE — 82550 ASSAY OF CK (CPK): CPT

## 2020-06-11 PROCEDURE — 82553 CREATINE MB FRACTION: CPT

## 2020-06-11 PROCEDURE — 85730 THROMBOPLASTIN TIME PARTIAL: CPT

## 2020-06-11 RX ADMIN — LOSARTAN POTASSIUM 25 MILLIGRAM(S): 100 TABLET, FILM COATED ORAL at 05:39

## 2020-06-11 NOTE — PROGRESS NOTE ADULT - ATTENDING COMMENTS
Agree with plan as outlined above.
Agree with plan as outlined above.
Joleen Kate MD  Division of Hospital Medicine  Cell: [phone]  Pager: [phone]  Office: [phone]

## 2020-06-11 NOTE — CHART NOTE - NSCHARTNOTEFT_GEN_A_CORE
54 M PMH CAD/AWSTEMI (2017) s/p EZRA x 3 to LAD, ICM, HLD, former tobacco p/w CP x 1 day, s/p diagnostic LHC 6/10 . Pt presented with unstable angina and persistent chest tightness.   Troponin negative x3, EKG without acute changes. Pt went for LHC. TTE was done which showed LV dysfunction (35%), normal RV function. Pt monitored on telemetry, where noted to be bradycardia but baseline bradycardia, so cards recommended continuation of metoprolol. He underwent LHC without signs of progression of disease. He has remained stable for discharge with close follow up with Dr. German.    43 minutes spent on discharge process    Joleen Kate MD  Division of Hospital Medicine  Cell: [phone]  Pager: [phone]  Office: [phone]

## 2020-06-11 NOTE — PROGRESS NOTE ADULT - SUBJECTIVE AND OBJECTIVE BOX
Patient seen and examined at bedside.    Overnight Events: NAEO. This AM, pt denies any further CP. No abd pain. He had LHC today, which revealed no progression of dz.    Review Of Systems: No chest pain, shortness of breath, or palpitations            Current Meds:  aluminum hydroxide/magnesium hydroxide/simethicone Suspension 30 milliLiter(s) Oral every 4 hours PRN  aspirin enteric coated 81 milliGRAM(s) Oral daily  atorvastatin 80 milliGRAM(s) Oral at bedtime  enoxaparin Injectable 40 milliGRAM(s) SubCutaneous daily  famotidine    Tablet 40 milliGRAM(s) Oral two times a day  losartan 25 milliGRAM(s) Oral daily      Vitals:  T(F): 97.9 (06-11), Max: 97.9 (06-11)  HR: 47 (06-11) (41 - 59)  BP: 121/76 (06-11) (100/51 - 128/80)  RR: 18 (06-11)  SpO2: 94% (06-11)  I&O's Summary      Physical Exam:  Gen: NAD.  HEENT: NCAT. PERRLA b/l.  Neck: No JVP elev.  CV: Normal S1, S2. RRR. No MRG.  Chest: CTAB. No WRR.  Abd: +BSx4. Soft. NTND.  Ext: No LE edema. R radial site CDI. Neurovasc intact.  Skin: No cyanosis.                          14.2   6.53  )-----------( 240      ( 10 Kade 2020 06:36 )             42.8     06-10    141  |  104  |  12  ----------------------------<  88  4.0   |  26  |  0.97    Ca    9.0      10 Kade 2020 06:36  Phos  4.0     06-10  Mg     2.1     06-10    TPro  6.1  /  Alb  4.0  /  TBili  0.5  /  DBili  x   /  AST  24  /  ALT  18  /  AlkPhos  70  06-10    PT/INR - ( 09 Jun 2020 17:05 )   PT: 11.2 sec;   INR: 0.98 ratio         PTT - ( 09 Jun 2020 17:05 )  PTT:35.7 sec  CARDIAC MARKERS ( 10 Kade 2020 06:36 )  13 ng/L / x     / x     / x     / x     / x      CARDIAC MARKERS ( 09 Jun 2020 22:12 )  9 ng/L / x     / x     / 126 U/L / x     / 3.3 ng/mL  CARDIAC MARKERS ( 09 Jun 2020 17:05 )  11 ng/L / x     / x     / x     / x     / x          Serum Pro-Brain Natriuretic Peptide: 266 pg/mL (06-09 @ 17:05)    Interpretation of Telemetry: Sinus kriss

## 2020-06-11 NOTE — PROGRESS NOTE ADULT - ASSESSMENT
55 y/o M w/ PMH of HLD, former tobacco, and CAD/AWSTEMI (2017) s/p EZRA x 3 to LAD c/o CP x 1D.    #CP  -Suspect possible GI source  -LHC done yest w/o progression of dz  -C/w home med ASA  -Would c/w home med metop despite sinus kriss. Goal HR ~50-60.  -R radial site CDI  -E/m of any abd source of pain as per primary team    #ICMP/HFrEF  -C/w home med metop, as above  -C/w home med losartan  -Appears well compensated currently  -Is/Os, daily wts    #No barriers to d/c from cards perspective. Pt should have close f/u w/ his cardiologist, Dr. Maxi Shook MD  Cardiology Fellow  [phone]  All Cardiology service information can be found 24/7 on [email or web address], password: [email or web address]

## 2020-06-11 NOTE — DISCHARGE NOTE NURSING/CASE MANAGEMENT/SOCIAL WORK - PATIENT PORTAL LINK FT
You can access the FollowMyHealth Patient Portal offered by [facility] by registering at the following website: [email or web address]. By joining [employer]’s FollowMyHealth portal, you will also be able to view your health information using other applications (apps) compatible with our system.

## 2020-08-31 ENCOUNTER — RX RENEWAL (OUTPATIENT)
Age: 55
End: 2020-08-31

## 2020-09-09 ENCOUNTER — NON-APPOINTMENT (OUTPATIENT)
Age: 55
End: 2020-09-09

## 2020-09-09 ENCOUNTER — APPOINTMENT (OUTPATIENT)
Dept: CARDIOLOGY | Facility: CLINIC | Age: 55
End: 2020-09-09
Payer: COMMERCIAL

## 2020-09-09 VITALS
HEIGHT: 72 IN | DIASTOLIC BLOOD PRESSURE: 72 MMHG | OXYGEN SATURATION: 98 % | BODY MASS INDEX: 27.09 KG/M2 | HEART RATE: 63 BPM | SYSTOLIC BLOOD PRESSURE: 107 MMHG | WEIGHT: 200 LBS

## 2020-09-09 PROCEDURE — 93000 ELECTROCARDIOGRAM COMPLETE: CPT

## 2020-09-09 PROCEDURE — 99213 OFFICE O/P EST LOW 20 MIN: CPT

## 2020-09-09 NOTE — PHYSICAL EXAM
[General Appearance - Well Developed] : well developed [Normal Appearance] : normal appearance [Well Groomed] : well groomed [No Deformities] : no deformities [General Appearance - Well Nourished] : well nourished [General Appearance - In No Acute Distress] : no acute distress [Normal Conjunctiva] : the conjunctiva exhibited no abnormalities [Eyelids - No Xanthelasma] : the eyelids demonstrated no xanthelasmas [Normal Oral Mucosa] : normal oral mucosa [No Oral Pallor] : no oral pallor [No Oral Cyanosis] : no oral cyanosis [Normal Jugular Venous A Waves Present] : normal jugular venous A waves present [Normal Jugular Venous V Waves Present] : normal jugular venous V waves present [No Jugular Venous Zhang A Waves] : no jugular venous zhang A waves [Exaggerated Use Of Accessory Muscles For Inspiration] : no accessory muscle use [Respiration, Rhythm And Depth] : normal respiratory rhythm and effort [Auscultation Breath Sounds / Voice Sounds] : lungs were clear to auscultation bilaterally [Heart Rate And Rhythm] : heart rate and rhythm were normal [Heart Sounds] : normal S1 and S2 [Murmurs] : no murmurs present [Abdomen Soft] : soft [Abdomen Tenderness] : non-tender [Abdomen Mass (___ Cm)] : no abdominal mass palpated [Abnormal Walk] : normal gait [Gait - Sufficient For Exercise Testing] : the gait was sufficient for exercise testing [Nail Clubbing] : no clubbing of the fingernails [Cyanosis, Localized] : no localized cyanosis [Petechial Hemorrhages (___cm)] : no petechial hemorrhages [Skin Color & Pigmentation] : normal skin color and pigmentation [] : no rash [No Venous Stasis] : no venous stasis [Skin Lesions] : no skin lesions [No Skin Ulcers] : no skin ulcer [No Xanthoma] : no  xanthoma was observed [Oriented To Time, Place, And Person] : oriented to person, place, and time [Affect] : the affect was normal [Mood] : the mood was normal [No Anxiety] : not feeling anxious

## 2020-09-09 NOTE — REASON FOR VISIT
[Follow-Up - Clinic] : a clinic follow-up of [Cardiomyopathy] : cardiomyopathy [Coronary Artery Disease] : coronary artery disease [Medication Management] : Medication management [Hyperlipidemia] : hyperlipidemia [Prior Myocardial Infarction] : a prior myocardial infarction

## 2020-09-10 NOTE — REVIEW OF SYSTEMS
[Negative] : Heme/Lymph [Dyspnea on exertion] : not dyspnea during exertion [Feeling Fatigued] : not feeling fatigued

## 2020-09-10 NOTE — HISTORY OF PRESENT ILLNESS
[FreeTextEntry1] : Ezequiel is returning \par He was a late presentation anterior wall - underwent PCI to the LAD, required a IABP post procedure\par MRI months after PCI revealed an EF of 39% \par Recent repeat admission for CP - repeat cath with nonobstructive CAD\par \par Here to follow-up\par Feels well\par No palpitations\par No Orthopnea\par No syncope\par No bleeding\par No LE edema\par

## 2020-09-28 LAB
ALBUMIN SERPL ELPH-MCNC: 4.8 G/DL
ALP BLD-CCNC: 80 U/L
ALT SERPL-CCNC: 22 U/L
ANION GAP SERPL CALC-SCNC: 16 MMOL/L
AST SERPL-CCNC: 29 U/L
BILIRUB SERPL-MCNC: 0.7 MG/DL
BUN SERPL-MCNC: 18 MG/DL
CALCIUM SERPL-MCNC: 9.6 MG/DL
CHLORIDE SERPL-SCNC: 102 MMOL/L
CHOLEST SERPL-MCNC: 136 MG/DL
CHOLEST/HDLC SERPL: 2.4 RATIO
CO2 SERPL-SCNC: 24 MMOL/L
CREAT SERPL-MCNC: 1.09 MG/DL
ESTIMATED AVERAGE GLUCOSE: 120 MG/DL
GLUCOSE SERPL-MCNC: 89 MG/DL
HBA1C MFR BLD HPLC: 5.8 %
HDLC SERPL-MCNC: 56 MG/DL
LDLC SERPL CALC-MCNC: 64 MG/DL
POTASSIUM SERPL-SCNC: 4.7 MMOL/L
PROT SERPL-MCNC: 6.7 G/DL
SODIUM SERPL-SCNC: 141 MMOL/L
TRIGL SERPL-MCNC: 80 MG/DL

## 2020-10-30 ENCOUNTER — RX RENEWAL (OUTPATIENT)
Age: 55
End: 2020-10-30

## 2021-01-28 ENCOUNTER — RX RENEWAL (OUTPATIENT)
Age: 56
End: 2021-01-28

## 2021-02-09 NOTE — DISCHARGE NOTE PROVIDER - HOSPITAL COURSE
54 M PMH CAD/AWSTEMI (2017) s/p EZRA x 3 to LAD, ICM, HLD, former tobacco p/w CP x 1 day, concerning for unstable angina. Trop negative x 3, EKG without acute changes. Telemetry notable for bradycardia in 40-50s, TTE with LV dysfunction 35%, no remarkable changes from previous TTE in 2/2020. Pt underwent LHC which demonstrated  . Pt has remained stable for discharge with close follow up with Dr. German. 54 M PMH CAD/AWSTEMI (2017) s/p EZRA x 3 to LAD, ICM, HLD, former tobacco p/w CP x 1 day, scheduled for LHC today. Pt presented with unstable angina and persistent chest tightness. Troponin negative x3, EKG without acute changes. Pt went for LHC today. TTE was done which showed LV dysfunction (35%), normal RV function. Pt monitored on telemetry. Sinus bradycardia, which was baseline bradycardia, as VS from allscripts suggest his usual HR is 50-60; currently asx, denies LH/dizziness/syncope/sob. Pt monitored on telemetry. Pt toprol is being temporarily held because patient bradycardic to 40s. Ischemic cardiomyopathy- generally euvolemic; no peripheral edema on exam, no sob, CXR clear, probnp nml. Temporarily  holding betablocker given bradycardic.     -c/w arb 54 M PMH CAD/AWSTEMI (2017) s/p EZRA x 3 to LAD, ICM, HLD, former tobacco p/w CP x 1 day, s/p diagnostic LHC 6/10 . Pt presented with unstable angina and persistent chest tightness.     Troponin negative x3, EKG without acute changes. Pt went for LHC. TTE was done which showed LV dysfunction (35%), normal RV function. Pt monitored on telemetry. Sinus bradycardia,     which was baseline bradycardia, as VS from allscripts suggest his usual HR is 50-60; currently asx, denies LH/dizziness/syncope/sob. Ischemic cardiomyopathy- generally euvolemic; no peripheral edema on exam, no sob, CXR clear, probnp nml. LHC showed no progression of disease.         DCP with medication reconciliation discussed with Dr. Kate.     Patient cleared for discharge home with close outpt follow up with Dr. German. 54 M PMH CAD/AWSTEMI (2017) s/p EZRA x 3 to LAD, ICM, HLD, former tobacco p/w CP x 1 day, s/p diagnostic LHC 6/10 . Pt presented with unstable angina and persistent chest tightness.     Troponin negative x3, EKG without acute changes. TTE was done which showed LV dysfunction (35%), normal RV function. Pt monitored on telemetry. Sinus bradycardia,     which was baseline bradycardia, as VS from allscripts suggest his usual HR is 50-60; currently asx, denies LH/dizziness/syncope/sob. Ischemic cardiomyopathy- generally euvolemic; no peripheral edema on exam, no sob, CXR clear, probnp nml. LHC showed no progression of disease.         DCP with medication reconciliation discussed with Dr. Kate.     Patient cleared for discharge home with close outpt follow up with Dr. German. Statement Selected

## 2021-03-10 ENCOUNTER — NON-APPOINTMENT (OUTPATIENT)
Age: 56
End: 2021-03-10

## 2021-03-10 ENCOUNTER — APPOINTMENT (OUTPATIENT)
Dept: CARDIOLOGY | Facility: CLINIC | Age: 56
End: 2021-03-10
Payer: COMMERCIAL

## 2021-03-10 VITALS
OXYGEN SATURATION: 97 % | HEIGHT: 72 IN | HEART RATE: 50 BPM | SYSTOLIC BLOOD PRESSURE: 118 MMHG | BODY MASS INDEX: 27.09 KG/M2 | DIASTOLIC BLOOD PRESSURE: 75 MMHG | WEIGHT: 200 LBS

## 2021-03-10 PROCEDURE — 93000 ELECTROCARDIOGRAM COMPLETE: CPT

## 2021-03-10 PROCEDURE — 99072 ADDL SUPL MATRL&STAF TM PHE: CPT

## 2021-03-10 PROCEDURE — 99213 OFFICE O/P EST LOW 20 MIN: CPT

## 2021-03-11 NOTE — DISCUSSION/SUMMARY
[FreeTextEntry1] : s/p ant MI - LAD PCI. \par Additional LCx disease - not significant on reassessment (repeat cath)\par \par [employer] meds \par No angina\par No HF\par Check labs\par Encouraged continued exercise and med compliance

## 2021-03-11 NOTE — HISTORY OF PRESENT ILLNESS
[FreeTextEntry1] : Ezequiel is returning for routine f/u\par  \par Feels well\par No palpitations\par No Orthopnea\par No syncope\par No bleeding\par No LE edema\par Works with a  3-4 X/week without symptoms\par \par

## 2021-03-29 LAB
25(OH)D3 SERPL-MCNC: 26.8 NG/ML
ALBUMIN SERPL ELPH-MCNC: 4.7 G/DL
ALP BLD-CCNC: 76 U/L
ALT SERPL-CCNC: 16 U/L
ANION GAP SERPL CALC-SCNC: 10 MMOL/L
AST SERPL-CCNC: 23 U/L
BILIRUB SERPL-MCNC: 0.7 MG/DL
BUN SERPL-MCNC: 15 MG/DL
CALCIUM SERPL-MCNC: 9.7 MG/DL
CHLORIDE SERPL-SCNC: 104 MMOL/L
CHOLEST SERPL-MCNC: 123 MG/DL
CO2 SERPL-SCNC: 28 MMOL/L
CREAT SERPL-MCNC: 1.14 MG/DL
ESTIMATED AVERAGE GLUCOSE: 117 MG/DL
GLUCOSE SERPL-MCNC: 98 MG/DL
HBA1C MFR BLD HPLC: 5.7 %
HDLC SERPL-MCNC: 52 MG/DL
LDLC SERPL CALC-MCNC: 55 MG/DL
NONHDLC SERPL-MCNC: 71 MG/DL
POTASSIUM SERPL-SCNC: 5.5 MMOL/L
PROT SERPL-MCNC: 6.7 G/DL
SODIUM SERPL-SCNC: 142 MMOL/L
TRIGL SERPL-MCNC: 82 MG/DL
TSH SERPL-ACNC: 1.74 UIU/ML

## 2021-03-29 RX ORDER — ASPIRIN 81 MG/1
81 TABLET, COATED ORAL
Qty: 90 | Refills: 3 | Status: DISCONTINUED | COMMUNITY
Start: 2018-09-24 | End: 2021-03-29

## 2021-04-28 ENCOUNTER — RX RENEWAL (OUTPATIENT)
Age: 56
End: 2021-04-28

## 2021-07-26 ENCOUNTER — RX RENEWAL (OUTPATIENT)
Age: 56
End: 2021-07-26

## 2021-08-24 ENCOUNTER — RX RENEWAL (OUTPATIENT)
Age: 56
End: 2021-08-24

## 2021-10-15 ENCOUNTER — RX RENEWAL (OUTPATIENT)
Age: 56
End: 2021-10-15

## 2021-10-27 ENCOUNTER — APPOINTMENT (OUTPATIENT)
Dept: CARDIOLOGY | Facility: CLINIC | Age: 56
End: 2021-10-27
Payer: COMMERCIAL

## 2021-10-27 VITALS
BODY MASS INDEX: 28.04 KG/M2 | OXYGEN SATURATION: 98 % | HEART RATE: 50 BPM | HEIGHT: 72 IN | DIASTOLIC BLOOD PRESSURE: 74 MMHG | WEIGHT: 207 LBS | SYSTOLIC BLOOD PRESSURE: 106 MMHG

## 2021-10-27 PROCEDURE — 93000 ELECTROCARDIOGRAM COMPLETE: CPT

## 2021-10-27 PROCEDURE — 99213 OFFICE O/P EST LOW 20 MIN: CPT

## 2021-11-28 ENCOUNTER — NON-APPOINTMENT (OUTPATIENT)
Age: 56
End: 2021-11-28

## 2021-11-28 NOTE — DISCUSSION/SUMMARY
[FreeTextEntry1] : s/p ant MI - LAD PCI. \par Additional LCx disease - not significant on reassessment (repeat cath)\par \par Ibng meds \par No angina\par No HF\par Encouraged continued exercise and med compliance

## 2021-11-28 NOTE — CARDIOLOGY SUMMARY
[de-identified] : 10/27/21\par Sinus  Bradycardia \par -Old anterior infarct. \par  -  Nonspecific T-abnormality. \par \par ABNORMAL \par

## 2021-11-28 NOTE — HISTORY OF PRESENT ILLNESS
[FreeTextEntry1] : Ezequiel is returning for routine f/u\par  \par Feels well\par No palpitations\par No Orthopnea\par No syncope\par No bleeding\par No LE edema\par

## 2022-02-03 ENCOUNTER — APPOINTMENT (OUTPATIENT)
Dept: CV DIAGNOSTICS | Facility: HOSPITAL | Age: 57
End: 2022-02-03

## 2022-02-03 ENCOUNTER — OUTPATIENT (OUTPATIENT)
Dept: OUTPATIENT SERVICES | Facility: HOSPITAL | Age: 57
LOS: 1 days | End: 2022-02-03
Payer: COMMERCIAL

## 2022-02-03 DIAGNOSIS — I25.5 ISCHEMIC CARDIOMYOPATHY: ICD-10-CM

## 2022-02-03 DIAGNOSIS — Z95.5 PRESENCE OF CORONARY ANGIOPLASTY IMPLANT AND GRAFT: Chronic | ICD-10-CM

## 2022-02-03 PROCEDURE — A9500: CPT

## 2022-02-03 PROCEDURE — 93018 CV STRESS TEST I&R ONLY: CPT

## 2022-02-03 PROCEDURE — 93017 CV STRESS TEST TRACING ONLY: CPT

## 2022-02-03 PROCEDURE — 78452 HT MUSCLE IMAGE SPECT MULT: CPT | Mod: 26,MH

## 2022-02-03 PROCEDURE — 78452 HT MUSCLE IMAGE SPECT MULT: CPT | Mod: MH

## 2022-02-03 PROCEDURE — 93016 CV STRESS TEST SUPVJ ONLY: CPT

## 2022-03-22 ENCOUNTER — RX RENEWAL (OUTPATIENT)
Age: 57
End: 2022-03-22

## 2022-04-27 ENCOUNTER — APPOINTMENT (OUTPATIENT)
Dept: CARDIOLOGY | Facility: CLINIC | Age: 57
End: 2022-04-27

## 2022-05-23 ENCOUNTER — RX RENEWAL (OUTPATIENT)
Age: 57
End: 2022-05-23

## 2022-05-25 ENCOUNTER — APPOINTMENT (OUTPATIENT)
Dept: CARDIOLOGY | Facility: CLINIC | Age: 57
End: 2022-05-25
Payer: COMMERCIAL

## 2022-05-25 ENCOUNTER — NON-APPOINTMENT (OUTPATIENT)
Age: 57
End: 2022-05-25

## 2022-05-25 VITALS
DIASTOLIC BLOOD PRESSURE: 79 MMHG | OXYGEN SATURATION: 97 % | HEART RATE: 50 BPM | HEIGHT: 72 IN | SYSTOLIC BLOOD PRESSURE: 122 MMHG

## 2022-05-25 PROCEDURE — 93000 ELECTROCARDIOGRAM COMPLETE: CPT

## 2022-05-25 PROCEDURE — 99214 OFFICE O/P EST MOD 30 MIN: CPT

## 2022-05-26 NOTE — CARDIOLOGY SUMMARY
[de-identified] : 5/25/22\par Sinus  Rhythm  - occasional ectopic ventricular beat   \par -Poor R-wave progression -nonspecific -consider old anterior infarct. \par \par BORDERLINE\par

## 2022-05-26 NOTE — HISTORY OF PRESENT ILLNESS
[FreeTextEntry1] : Ezequiel is returning for routine f/u\par  \par Feels well\par No palpitations\par No Orthopnea\par No syncope\par No bleeding\par No LE edema\par No CP

## 2022-05-26 NOTE — DISCUSSION/SUMMARY
[FreeTextEntry1] : s/p ant MI - LAD PCI. \par Additional LCx disease - not significant on reassessment (repeat cath) - recent stress test without sign ischemia; EF 39%\par \par [employer] meds \par No angina\par No HF\par Encouraged continued exercise and med compliance\par Check labs

## 2022-08-24 ENCOUNTER — RX RENEWAL (OUTPATIENT)
Age: 57
End: 2022-08-24

## 2022-08-25 NOTE — DISCHARGE NOTE ADULT - CLICK TO LAUNCH ORM
Continuity of Care Form    Patient Name: Richie Morales   :  1988  MRN:  [id number]    Admit date:  2022  Discharge date:  2022    Code Status Order: Full Code   Advance Directives:     Admitting Physician:  No admitting provider for patient encounter. PCP: Bren Phelan    Discharging Nurse: Demetrius Copeland. [address] Unit/Room#: 6545/9940-B  Discharging Unit Phone Number: [phone]    Emergency Contact:   Extended Emergency Contact Information  Primary Emergency Contact: kubo financiero  Mobile Phone: [phone]  Relation: Parent   needed? No  Secondary Emergency ContactDanilo Bailon  Mobile Phone: [phone]  Relation: Parent   needed?  No    Past Surgical History:  Past Surgical History:   Procedure Laterality Date    DIALYSIS FISTULA CREATION Left 2022    LEFT AV FISTULA CREATION performed by Luciana Avina MD at [address] [place] [address] Bilateral 2022    BILATERAL HEEL DEBRIDEMENT INCISION AND DRAINAGE performed by Elodia Wallace MD at [address] [place] [address] Right 2022    RIGHT HIP ULCER DEBRIDEMENT INCISION AND DRAINAGE performed by Elodia Wallace MD at [facility]    IR NONTUNNELED VASCULAR CATHETER  2022    IR NONTUNNELED VASCULAR CATHETER 2022 [facility]    IR NONTUNNELED VASCULAR CATHETER  2022    IR NONTUNNELED VASCULAR CATHETER 2022 [facility]    IR REMOVAL OF TUNNELED PLEURAL CATH W CUFF  2022    IR REMOVAL OF TUNNELED PLEURAL CATH W CUFF 2022 [facility]    IR TUNNELED CATHETER PLACEMENT GREATER THAN 5 YEARS  2021    IR TUNNELED CATHETER PLACEMENT GREATER THAN 5 YEARS 2021 [facility]    IR TUNNELED CATHETER PLACEMENT GREATER THAN 5 YEARS  2022    IR TUNNELED CATHETER PLACEMENT GREATER THAN 5 YEARS 2022 [facility]    IR TUNNELED CATHETER PLACEMENT GREATER THAN 5 YEARS  2022    IR TUNNELED CATHETER PLACEMENT GREATER THAN 5 YEARS 6/20/2022 [facility] SPECIAL PROCEDURES    IR TUNNELED CATHETER PLACEMENT GREATER THAN 5 YEARS  8/12/2022    IR TUNNELED CATHETER PLACEMENT GREATER THAN 5 YEARS 8/12/2022 [facility]    LEG AMPUTATION BELOW KNEE Left 5/20/2022    LEG AMPUTATION BELOW KNEE-LEFT, RIGHT HEEL WOUND VAC DRESSING CHANGE performed by Vishnu Osborn MD at [address] Right 6/14/2022    BELOW KNEE AMPUTATION performed by Vishnu Osborn MD at [address] Right 7/26/2022    RIGHT BKA LEG DEBRIDEMENT INCISION AND DRAINAGE performed by Vishnu Osborn MD at [address] N/A 11/22/2021    ISCHIAL WOUND DEBRIDEMENT WOUND VAC PLACEMENT performed by Vishnu Osborn MD at [address] [place] 7/30/2022    EGD DIAGNOSTIC ONLY performed by Olivier Murphy MD at [facility] ENDOSCOPY       Immunization History: There is no immunization history on file for this patient. Active Problems:  Patient Active Problem List   Diagnosis Code    NSTEMI (non-ST elevated myocardial infarction) ([facility] I21.4    ESRD on hemodialysis ([facility]) N18.6, Z99.2    Hyperkalemia E87.5    Hypervolemia E87.70    Unresponsiveness R41.89    Encephalopathy acute G93.40    Septicemia ([facility]) A41.9    Hyponatremia E87.1    Hypertensive urgency I16.0    Hypertension I10    WD-Decubitus ulcer of left buttock, stage 3 ([facility]) L89.323    WD-Decubitus ulcer of right buttock, stage 3 ([facility] L89.313    WD-Friction injury to skin (coccyx) T14. 8XXA    WD-Decubitus ulcer of left buttock, stage 4 ([facility]) L89.324    WD-Decubitus ulcer of right buttock, stage 4 ([facility]) L89.314    WD-Type 1 diabetes mellitus with diabetic chronic kidney disease ([facility] E10.22    Pneumonia due to infectious organism B87.6    Acute metabolic encephalopathy N58.96    Infected decubitus ulcer, stage IV ([facility])-BILATERAL SACRAL DECUBITUS ULCERS L89.94, L08.9    Troponin I above reference range R77.8 Altered mental status R41.82    Sacral decubitus ulcer, stage IV ([facility]) X17.359    Toxic metabolic encephalopathy N98.3    Hypoglycemia E16.2    Anemia D64.9    E. coli bacteremia R78.81, B96.20    Hemodialysis-associated hypotension I95.3    Hypotension I95.9    Adjustment disorder with mixed anxiety and depressed mood F43.23    Depression, major, recurrent, moderate ([facility]) F33.1    Bacteremia due to Streptococcus R78.81, B95.5    Dyspnea and respiratory abnormalities R06.00, R06.89    Acute upper GI bleed K92.2    Sepsis due to Streptococcus pyogenes ([facility]) A40.0    Abnormal CT of the head R93.0    Acute embolism and thrombosis of right internal jugular vein ([facility]) I82. C11       Isolation/Infection:   Isolation            Contact          Patient Infection Status       Infection Onset Added Last Indicated Last Indicated By Review Planned Expiration Resolved Resolved By    Acinetobacter, MDRO  08/01/22 08/01/22 Arleen Shah RN          Acinetobacter baumannii buttocks wound 6/8/2022    Acinetobacter baumannii RLE hematoma tissue 7/26/22    ESBL (Extended Spectrum Beta Lactamase) 05/20/22 05/25/22 08/03/22 Culture, Blood 2        MDRO (multi-drug resistant organism) 08/02/21 09/01/21 08/03/22 Culture, Blood 2        Blood culture, 5/15/22 E. Coli MDRO    ESCHERICHIA COLI Wound - Heel 5/17/22    PSEUDOMONAS AERUGINOSA  - Buttocks 5/20/22, 6/8/2022    Escherichia coli &  Proteus mirabilis 7/26/22 BONE CULTURE    8/4/22: Pseudomonas aeruginosa: sacral decub    VRE 03/26/21 09/01/21 06/07/22 Culture, Blood 1        Added from external infection.  HARRISONN    MRSA 08/02/21 08/04/21 07/26/22 Culture, MRSA, Screening        Resolved    COVID-19 (Rule Out) 07/25/22 07/25/22 07/26/22 COVID-19, Rapid (Ordered)   07/26/22 Rule-Out Test Resulted    COVID-19 (Rule Out) 06/07/22 06/07/22 06/07/22 Respiratory Panel, Molecular, with COVID-19 (Restricted: peds pts or suitable admitted adults) (Ordered)   06/07/22 Rule-Out Test Resulted    COVID-19 (Rule Out) 05/16/22 05/16/22 05/16/22 Respiratory Panel, Molecular, with COVID-19 (Restricted: peds pts or suitable admitted adults) (Ordered)   05/16/22 Rule-Out Test Resulted    COVID-19 (Rule Out) 05/15/22 05/15/22 05/15/22 COVID-19, Rapid (Ordered)   05/15/22 Rule-Out Test Resulted    COVID-19 (Rule Out) 11/17/21 11/17/21 11/17/21 COVID-19, Rapid (Ordered)   11/17/21 Rule-Out Test Resulted    C-diff Rule Out 10/10/21 10/10/21 10/11/21 Clostridium Difficile Toxin/Antigen (Ordered)   11/17/21 Janna Alfonso RN    COVID-19 (Rule Out) 08/22/21 08/22/21 08/22/21 COVID-19, Rapid (Ordered)   08/22/21 Rule-Out Test Resulted    C-diff Rule Out 08/22/21 08/22/21 08/22/21 C difficile Molecular/PCR (Ordered)   09/01/21 Gerardo Leahy RN    COVID-19 (Rule Out) 08/01/21 08/01/21 08/01/21 COVID-19, Rapid (Ordered)   08/01/21 Rule-Out Test Resulted            Nurse Assessment:  Last Vital Signs: BP (!) 146/83   Pulse 84   Temp 98.1 °F (36.7 °C) (Oral)   Resp 16   Ht 6' 2\" (1.88 m)   Wt 200 lb 2.8 oz (90.8 kg)   SpO2 97%   BMI 25.70 kg/m²     Last documented pain score (0-10 scale): Pain Level: 4  Last Weight:   Wt Readings from Last 1 Encounters:   08/25/22 200 lb 2.8 oz (90.8 kg)     Mental Status:  alert, coherent, and logical    IV Access:  - none    Nursing Mobility/ADLs:  Walking   Dependent  Transfer  Dependent  Bathing  Dependent  Dressing  Assisted  Toileting  Dependent  Feeding  Independent  Med Admin  Dependent  Med Delivery   whole and applesauce    Wound Care Documentation and Therapy:  Negative Pressure Wound Therapy Buttocks Left;Right (Active)   Number of days: 175       Wound 10/01/21 Buttocks Left #2 left buttocks  (Active)   Wound Image   08/25/22 1421   Wound Etiology Pressure Stage 4 08/25/22 1421   Dressing Status New dressing applied 08/25/22 1421   Wound Cleansed Cleansed with saline 08/25/22 1421   Dressing/Treatment Moist to dry;Silicone border 07/73/82 1421   Dressing Change Due 08/08/22 08/07/22 1554   Wound Length (cm) 4.5 cm 08/25/22 1421   Wound Width (cm) 1.7 cm 08/25/22 1421   Wound Depth (cm) 1.5 cm 08/25/22 1421   Wound Surface Area (cm^2) 7.65 cm^2 08/25/22 1421   Change in Wound Size % (l*w) 67.86 08/25/22 1421   Wound Volume (cm^3) 11.475 cm^3 08/25/22 1421   Wound Healing % 76 08/25/22 1421   Distance Tunneling (cm) 0 cm 08/25/22 1421   Tunneling Position ___ O'Clock 0 08/25/22 1421   Undermining Starts ___ O'Clock 9 08/25/22 1421   Undermining Ends___ O'Clock 3 08/25/22 1421   Undermining Maxium Distance (cm) 2 08/25/22 1421   Wound Assessment Pink/red 08/25/22 1421   Drainage Amount Moderate 08/25/22 1421   Drainage Description Serosanguinous 08/25/22 1421   Odor None 08/25/22 1421   Shakira-wound Assessment Intact 08/25/22 1421   Margins Defined edges 08/25/22 1421   Wound Thickness Description not for Pressure Injury Full thickness 08/25/22 1421   Number of days: 328       Wound 10/01/21 Buttocks Right #1 right buttocks  (Active)   Wound Image   08/25/22 1421   Wound Etiology Pressure Stage 4 08/25/22 1421   Dressing Status New dressing applied 08/25/22 1421   Wound Cleansed Cleansed with saline 08/25/22 1421   Dressing/Treatment Moist to dry;Silicone border 93/01/02 1421   Dressing Change Due 08/08/22 08/07/22 1554   Wound Length (cm) 5 cm 08/25/22 1421   Wound Width (cm) 4 cm 08/25/22 1421   Wound Depth (cm) 1.3 cm 08/25/22 1421   Wound Surface Area (cm^2) 20 cm^2 08/25/22 1421   Change in Wound Size % (l*w) 20.63 08/25/22 1421   Wound Volume (cm^3) 26 cm^3 08/25/22 1421   Wound Healing % -3 08/25/22 1421   Distance Tunneling (cm) 0 cm 08/25/22 1421   Tunneling Position ___ O'Clock 0 08/25/22 1421   Undermining Starts ___ O'Clock 12 08/25/22 1421   Undermining Ends___ O'Clock 3 08/25/22 1421   Undermining Maxium Distance (cm) 4.2 08/25/22 1421   Wound Assessment Pink/red 08/25/22 1421   Drainage Amount Moderate 08/25/22 1421   Drainage Description Serosanguinous 08/25/22 1421   Odor None 08/25/22 1421   Shakira-wound Assessment Intact 08/25/22 1421   Margins Defined edges 08/25/22 1421   Wound Thickness Description not for Pressure Injury Full thickness 08/25/22 1421   Number of days: 328       Wound 05/16/22 Sacrum (Active)   Wound Image   08/25/22 1421   Wound Etiology Pressure Unstageable 08/25/22 1421   Dressing Status New dressing applied 08/25/22 1421   Wound Cleansed Cleansed with saline 08/25/22 1421   Dressing/Treatment Moist to dry;Silicone border 54/22/80 1421   Offloading for Diabetic Foot Ulcers Other (comment) 08/25/22 1345   Dressing Change Due 08/08/22 08/07/22 1554   Wound Length (cm) 10 cm 08/25/22 1421   Wound Width (cm) 6 cm 08/25/22 1421   Wound Depth (cm) 0.3 cm 08/25/22 1421   Wound Surface Area (cm^2) 60 cm^2 08/25/22 1421   Change in Wound Size % (l*w) -900 08/25/22 1421   Wound Volume (cm^3) 18 cm^3 08/25/22 1421   Wound Healing % -2900 08/25/22 1421   Distance Tunneling (cm) 0 cm 08/25/22 1421   Tunneling Position ___ O'Clock 0 08/25/22 1421   Undermining Starts ___ O'Clock 0 08/25/22 1421   Undermining Ends___ O'Clock 0 08/25/22 1421   Undermining Maxium Distance (cm) 0 08/25/22 1421   Wound Assessment [place]/red;Slough 08/25/22 1421   Drainage Amount Moderate 08/25/22 1421   Drainage Description Serosanguinous 08/25/22 1421   Odor None 08/25/22 1421   Shakira-wound Assessment Intact 08/25/22 1421   Margins Defined edges 08/25/22 1421   Wound Thickness Description not for Pressure Injury Full thickness 08/25/22 1421   Number of days: 101       Wound 07/25/22 Pretibial Left;Lateral cluster (Active)   Wound Image   08/25/22 1421   Wound Etiology Pressure Unstageable 08/25/22 1421   Dressing Status Clean;Dry; Intact 08/25/22 1345   Wound Cleansed Cleansed with saline 08/25/22 1421   Dressing/Treatment Open to air 08/25/22 1421   Dressing Change Due 08/14/22 08/25/22 1345   Wound Length (cm) 13 cm 08/25/22 1421   Wound Width (cm) 12 cm 08/25/22 1421   Wound Depth (cm) 0.1 cm 08/25/22 1421   Wound Surface Area (cm^2) 156 cm^2 08/25/22 1421   Change in Wound Size % (l*w) -732 08/25/22 1421   Wound Volume (cm^3) 15.6 cm^3 08/25/22 1421   Distance Tunneling (cm) 0 cm 08/25/22 1421   Tunneling Position ___ O'Clock 0 08/25/22 1421   Undermining Starts ___ O'Clock 0 08/25/22 1421   Undermining Ends___ O'Clock 0 08/25/22 1421   Undermining Maxium Distance (cm) 0 08/25/22 1421   Wound Assessment Eschar dry; Purple/maroon 08/25/22 1421   Drainage Amount None 08/25/22 1421   Odor None 08/25/22 1421   Shakira-wound Assessment Intact 08/25/22 1421   Margins Attached edges 08/25/22 1421   Number of days: 31       Incision 07/26/22 Knee Right;Lateral (Active)   Wound Image   08/09/22 0830   Dressing Status Clean;Dry; Intact 08/24/22 2047   Dressing Change Due 08/08/22 08/07/22 1554   Incision Cleansed Not Cleansed 08/09/22 1800   Dressing/Treatment Ace wrap;ABD pad;Gauze dressing/dressing sponge;Xeroform 08/13/22 0759   Closure Sutures 08/25/22 1345   Margins Other (Comment) 08/25/22 1345   Incision Assessment Other (Comment) 08/25/22 1345   Drainage Amount None 08/25/22 1345   Drainage Description Serosanguinous 08/25/22 1345   Odor None 08/25/22 1345   Shakira-incision Assessment Other (Comment) 08/25/22 1345   Number of days: 30       Incision 08/11/22 Elbow Anterior; Left (Active)   Dressing Status Clean;Dry; Intact 08/25/22 1345   Closure Sutures 08/24/22 2047   Number of days: 14        Elimination:  Continence:    Bowel: No  Bladder: No  Urinary Catheter: None   Colostomy/Ileostomy/Ileal Conduit: Yes  Colostomy LLQ-Stomal Appliance: 2 piece, Clean, dry & intact  Colostomy LLQ-Stoma  Assessment: Pink, Protrudes, Moist  Colostomy LLQ-Peristomal Assessment: Clean, dry & intact  Colostomy LLQ-Stool Appearance: Loose  Colostomy LLQ-Stool Color: Brown  Colostomy LLQ-Stool Amount: Large    Date of Last BM: 08/25/2022    Intake/Output Summary (Last 24 hours) at 8/25/2022 1653  Last data filed at 8/25/2022 1123  Gross per 24 hour   Intake 500 ml   Output 2500 ml   Net -2000 ml     No intake/output data recorded. Safety Concerns: At Risk for Falls    Impairments/Disabilities:      Amputation - BLE B BKA    Nutrition Therapy:  Current Nutrition Therapy:   - Oral Diet:  Carb Control 3 carbs/meal (1500kcals/day)    Routes of Feeding: Oral  Liquids: No Restrictions  Daily Fluid Restriction: no  Last Modified Barium Swallow with Video (Video Swallowing Test): not done    Treatments at the Time of Hospital Discharge:   Respiratory Treatments: none  Oxygen Therapy:  is on oxygen at 2 L/min per nasal cannula. Ventilator:    - No ventilator support    Rehab Therapies: Physical Therapy and Occupational Therapy  Weight Bearing Status/Restrictions: non weight bearing  Other Medical Equipment (for information only, NOT a DME order):  wound vac  Other Treatments:     Patient's personal belongings (please select all that are sent with patient):  None    RN SIGNATURE:  Electronically signed by Mikey Marrero RN on 8/25/22 at 5:34 PM EDT    CASE MANAGEMENT/SOCIAL WORK SECTION    Inpatient Status Date:       Readmission Risk Assessment Score:  Readmission Risk              Risk of Unplanned Readmission:  48           Discharging to Facility/ Agency   Name:   Address:  Phone:  Fax:    Dialysis Facility (if applicable)   Name:  Address:  Dialysis Schedule:  Phone:  Fax:    / signature: Electronically signed by Mikey Marrero RN on 8/25/22 at 5:33 PM EDT    PHYSICIAN SECTION    Prognosis: Fair    Condition at Discharge: Stable    Rehab Potential (if transferring to Rehab): Fair    Recommended Labs or Other Treatments After Discharge: CBC tomorrow    Physician Certification: I certify the above information and transfer of Mali Almeida  is necessary for the continuing treatment of the diagnosis listed and that he requires [facility] for greater 30 days.      Update Admission H&P: No change in H&P    PHYSICIAN SIGNATURE:  Electronically signed by Lukas Portillo MD on 8/25/22 at 4:53 PM EDT .

## 2022-09-27 LAB
ALBUMIN SERPL ELPH-MCNC: 4.7 G/DL
ALP BLD-CCNC: 97 U/L
ALT SERPL-CCNC: 20 U/L
ANION GAP SERPL CALC-SCNC: 9 MMOL/L
AST SERPL-CCNC: 21 U/L
BILIRUB SERPL-MCNC: 0.4 MG/DL
BUN SERPL-MCNC: 16 MG/DL
CALCIUM SERPL-MCNC: 9.7 MG/DL
CHLORIDE SERPL-SCNC: 105 MMOL/L
CHOLEST SERPL-MCNC: 120 MG/DL
CO2 SERPL-SCNC: 28 MMOL/L
CREAT SERPL-MCNC: 1.02 MG/DL
EGFR: 86 ML/MIN/1.73M2
ESTIMATED AVERAGE GLUCOSE: 120 MG/DL
GLUCOSE SERPL-MCNC: 94 MG/DL
HBA1C MFR BLD HPLC: 5.8 %
HDLC SERPL-MCNC: 49 MG/DL
LDLC SERPL CALC-MCNC: 60 MG/DL
NONHDLC SERPL-MCNC: 71 MG/DL
POTASSIUM SERPL-SCNC: 5.4 MMOL/L
PROT SERPL-MCNC: 6.7 G/DL
SODIUM SERPL-SCNC: 143 MMOL/L
TRIGL SERPL-MCNC: 58 MG/DL
TSH SERPL-ACNC: 1.78 UIU/ML

## 2022-09-28 RX ORDER — ASPIRIN ENTERIC COATED TABLETS 81 MG 81 MG/1
81 TABLET, DELAYED RELEASE ORAL
Qty: 90 | Refills: 3 | Status: DISCONTINUED | COMMUNITY
Start: 2019-08-09 | End: 2022-09-28

## 2022-11-04 ENCOUNTER — RX RENEWAL (OUTPATIENT)
Age: 57
End: 2022-11-04

## 2022-11-08 ENCOUNTER — RX RENEWAL (OUTPATIENT)
Age: 57
End: 2022-11-08

## 2022-11-15 ENCOUNTER — APPOINTMENT (OUTPATIENT)
Dept: CARDIOLOGY | Facility: CLINIC | Age: 57
End: 2022-11-15

## 2022-11-15 VITALS
HEART RATE: 55 BPM | OXYGEN SATURATION: 96 % | WEIGHT: 212 LBS | SYSTOLIC BLOOD PRESSURE: 135 MMHG | HEIGHT: 72 IN | BODY MASS INDEX: 28.71 KG/M2 | DIASTOLIC BLOOD PRESSURE: 83 MMHG

## 2022-11-15 PROCEDURE — 93000 ELECTROCARDIOGRAM COMPLETE: CPT

## 2022-11-15 PROCEDURE — 99214 OFFICE O/P EST MOD 30 MIN: CPT | Mod: 25

## 2022-11-16 ENCOUNTER — NON-APPOINTMENT (OUTPATIENT)
Age: 57
End: 2022-11-16

## 2022-12-02 ENCOUNTER — RX RENEWAL (OUTPATIENT)
Age: 57
End: 2022-12-02

## 2022-12-28 NOTE — HISTORY OF PRESENT ILLNESS
[FreeTextEntry1] : Ezequiel is here for f/u\par Feels well\par No CP\par No palps\par Exercises without symptoms\par Takes meds regularly

## 2022-12-28 NOTE — CARDIOLOGY SUMMARY
[de-identified] : 11/15/22\par Sinus  Bradycardia \par -Poor R-wave progression -nonspecific -consider old anterior infarct. \par \par BORDERLINE\par

## 2022-12-28 NOTE — DISCUSSION/SUMMARY
[FreeTextEntry1] : s/p ant MI - LAD PCI. \par Additional LCx disease - not significant on reassessment (repeat cath) - stress test without sign ischemia; EF 39%\par \par [facility] meds \par No angina\par No HF\par Encouraged continued exercise and med compliance [EKG obtained to assist in diagnosis and management of assessed problem(s)] : EKG obtained to assist in diagnosis and management of assessed problem(s)

## 2022-12-30 ENCOUNTER — RX RENEWAL (OUTPATIENT)
Age: 57
End: 2022-12-30

## 2023-05-16 ENCOUNTER — APPOINTMENT (OUTPATIENT)
Dept: CARDIOLOGY | Facility: CLINIC | Age: 58
End: 2023-05-16
Payer: COMMERCIAL

## 2023-05-16 VITALS
HEART RATE: 49 BPM | BODY MASS INDEX: 28.71 KG/M2 | HEIGHT: 72 IN | SYSTOLIC BLOOD PRESSURE: 133 MMHG | WEIGHT: 212 LBS | DIASTOLIC BLOOD PRESSURE: 87 MMHG | OXYGEN SATURATION: 97 %

## 2023-05-16 DIAGNOSIS — I25.5 ISCHEMIC CARDIOMYOPATHY: ICD-10-CM

## 2023-05-16 PROCEDURE — 93000 ELECTROCARDIOGRAM COMPLETE: CPT

## 2023-05-16 PROCEDURE — 99214 OFFICE O/P EST MOD 30 MIN: CPT | Mod: 25

## 2023-07-02 ENCOUNTER — NON-APPOINTMENT (OUTPATIENT)
Age: 58
End: 2023-07-02

## 2023-07-02 PROBLEM — I25.5 ISCHEMIC CARDIOMYOPATHY: Status: ACTIVE | Noted: 2018-09-09

## 2023-07-02 NOTE — DISCUSSION/SUMMARY
[FreeTextEntry1] : s/p ant MI - LAD PCI. \par Additional LCx disease - not significant on reassessment (repeat cath) - stress test without sign ischemia; EF 39%\par \par [employer] meds \par No angina\par No HF\par Encouraged continued exercise and med compliance\par Labs in Fall [EKG obtained to assist in diagnosis and management of assessed problem(s)] : EKG obtained to assist in diagnosis and management of assessed problem(s)

## 2023-07-02 NOTE — CARDIOLOGY SUMMARY
[de-identified] : 5/16/23\par Marked sinus  Bradycardia \par -Poor R-wave progression -nonspecific -consider old anterior infarct. \par  -  Nonspecific T-abnormality. \par \par ABNORMAL

## 2023-07-02 NOTE — HISTORY OF PRESENT ILLNESS
[FreeTextEntry1] : Ezequiel is here for f/u\par Feels well\par No CP\par No palps\par Exercises without symptoms - has not been as consistent recently\par Takes meds regularly

## 2023-08-09 ENCOUNTER — RX RENEWAL (OUTPATIENT)
Age: 58
End: 2023-08-09

## 2023-08-10 ENCOUNTER — RX RENEWAL (OUTPATIENT)
Age: 58
End: 2023-08-10

## 2023-11-21 ENCOUNTER — APPOINTMENT (OUTPATIENT)
Dept: CARDIOLOGY | Facility: CLINIC | Age: 58
End: 2023-11-21
Payer: COMMERCIAL

## 2023-11-21 VITALS
DIASTOLIC BLOOD PRESSURE: 78 MMHG | WEIGHT: 205 LBS | BODY MASS INDEX: 27.8 KG/M2 | OXYGEN SATURATION: 98 % | SYSTOLIC BLOOD PRESSURE: 117 MMHG | HEART RATE: 51 BPM

## 2023-11-21 VITALS — WEIGHT: 219 LBS | BODY MASS INDEX: 29.7 KG/M2

## 2023-11-21 DIAGNOSIS — I70.90 UNSPECIFIED ATHEROSCLEROSIS: ICD-10-CM

## 2023-11-21 DIAGNOSIS — I21.09 ST ELEVATION (STEMI) MYOCARDIAL INFARCTION INVOLVING OTHER CORONARY ARTERY OF ANTERIOR WALL: ICD-10-CM

## 2023-11-21 PROCEDURE — 93000 ELECTROCARDIOGRAM COMPLETE: CPT

## 2023-11-21 PROCEDURE — 99214 OFFICE O/P EST MOD 30 MIN: CPT | Mod: 25

## 2023-11-25 PROBLEM — I21.09 ACUTE MYOCARDIAL INFARCTION OF ANTERIOR WALL: Status: ACTIVE | Noted: 2017-09-25

## 2023-11-25 PROBLEM — I70.90 ATHEROSCLEROSIS: Status: ACTIVE | Noted: 2017-09-22

## 2023-12-11 ENCOUNTER — RX RENEWAL (OUTPATIENT)
Age: 58
End: 2023-12-11

## 2024-01-10 ENCOUNTER — RX RENEWAL (OUTPATIENT)
Age: 59
End: 2024-01-10

## 2024-01-10 RX ORDER — ATORVASTATIN CALCIUM 80 MG/1
80 TABLET, FILM COATED ORAL
Qty: 90 | Refills: 1 | Status: ACTIVE | COMMUNITY
Start: 2024-01-10 | End: 1900-01-01

## 2024-03-05 LAB
ALBUMIN SERPL ELPH-MCNC: 4.3 G/DL
ALP BLD-CCNC: 83 U/L
ALT SERPL-CCNC: 19 U/L
ANION GAP SERPL CALC-SCNC: 9 MMOL/L
AST SERPL-CCNC: 20 U/L
BILIRUB SERPL-MCNC: 0.6 MG/DL
BUN SERPL-MCNC: 16 MG/DL
CALCIUM SERPL-MCNC: 9.3 MG/DL
CHLORIDE SERPL-SCNC: 103 MMOL/L
CHOLEST SERPL-MCNC: 122 MG/DL
CO2 SERPL-SCNC: 27 MMOL/L
CREAT SERPL-MCNC: 1 MG/DL
EGFR: 88 ML/MIN/1.73M2
ESTIMATED AVERAGE GLUCOSE: 120 MG/DL
GLUCOSE SERPL-MCNC: 90 MG/DL
HBA1C MFR BLD HPLC: 5.8 %
HDLC SERPL-MCNC: 52 MG/DL
LDLC SERPL CALC-MCNC: 55 MG/DL
NONHDLC SERPL-MCNC: 70 MG/DL
POTASSIUM SERPL-SCNC: 5.2 MMOL/L
PROT SERPL-MCNC: 6.4 G/DL
PSA SERPL-MCNC: 0.32 NG/ML
SODIUM SERPL-SCNC: 139 MMOL/L
TRIGL SERPL-MCNC: 76 MG/DL
TSH SERPL-ACNC: 1.91 UIU/ML

## 2024-05-02 ENCOUNTER — RX RENEWAL (OUTPATIENT)
Age: 59
End: 2024-05-02

## 2024-05-02 RX ORDER — METOPROLOL SUCCINATE 25 MG/1
25 TABLET, EXTENDED RELEASE ORAL
Qty: 30 | Refills: 8 | Status: ACTIVE | COMMUNITY
Start: 2017-09-22 | End: 1900-01-01

## 2024-05-08 ENCOUNTER — RX RENEWAL (OUTPATIENT)
Age: 59
End: 2024-05-08

## 2024-05-08 RX ORDER — ATORVASTATIN CALCIUM 80 MG/1
80 TABLET, FILM COATED ORAL
Qty: 90 | Refills: 1 | Status: ACTIVE | COMMUNITY
Start: 2017-09-22 | End: 1900-01-01

## 2024-05-28 ENCOUNTER — NON-APPOINTMENT (OUTPATIENT)
Age: 59
End: 2024-05-28

## 2024-05-28 ENCOUNTER — APPOINTMENT (OUTPATIENT)
Dept: CARDIOLOGY | Facility: CLINIC | Age: 59
End: 2024-05-28

## 2024-05-28 VITALS
SYSTOLIC BLOOD PRESSURE: 112 MMHG | HEART RATE: 49 BPM | WEIGHT: 205 LBS | OXYGEN SATURATION: 96 % | DIASTOLIC BLOOD PRESSURE: 76 MMHG | BODY MASS INDEX: 27.77 KG/M2 | HEIGHT: 72 IN

## 2024-05-28 PROCEDURE — 93000 ELECTROCARDIOGRAM COMPLETE: CPT

## 2024-05-28 PROCEDURE — 99214 OFFICE O/P EST MOD 30 MIN: CPT | Mod: 25

## 2024-07-01 ENCOUNTER — RX RENEWAL (OUTPATIENT)
Age: 59
End: 2024-07-01

## 2024-07-16 ENCOUNTER — NON-APPOINTMENT (OUTPATIENT)
Age: 59
End: 2024-07-16

## 2024-07-16 ENCOUNTER — APPOINTMENT (OUTPATIENT)
Dept: CARDIOLOGY | Facility: CLINIC | Age: 59
End: 2024-07-16

## 2024-07-16 ENCOUNTER — APPOINTMENT (OUTPATIENT)
Dept: ELECTROPHYSIOLOGY | Facility: CLINIC | Age: 59
End: 2024-07-16

## 2024-07-16 VITALS
SYSTOLIC BLOOD PRESSURE: 107 MMHG | OXYGEN SATURATION: 96 % | BODY MASS INDEX: 29.12 KG/M2 | HEART RATE: 52 BPM | WEIGHT: 215 LBS | HEIGHT: 72 IN | DIASTOLIC BLOOD PRESSURE: 70 MMHG

## 2024-07-16 PROCEDURE — 93000 ELECTROCARDIOGRAM COMPLETE: CPT

## 2024-07-16 PROCEDURE — 99214 OFFICE O/P EST MOD 30 MIN: CPT | Mod: 25

## 2024-07-29 PROCEDURE — 93244 EXT ECG>48HR<7D REV&INTERPJ: CPT

## 2024-08-16 ENCOUNTER — NON-APPOINTMENT (OUTPATIENT)
Age: 59
End: 2024-08-16

## 2024-08-16 ENCOUNTER — APPOINTMENT (OUTPATIENT)
Dept: CV DIAGNOSTICS | Facility: HOSPITAL | Age: 59
End: 2024-08-16

## 2024-08-16 ENCOUNTER — RESULT REVIEW (OUTPATIENT)
Age: 59
End: 2024-08-16

## 2024-10-29 ENCOUNTER — RX RENEWAL (OUTPATIENT)
Age: 59
End: 2024-10-29

## 2024-12-24 PROBLEM — F10.90 ALCOHOL USE: Status: ACTIVE | Noted: 2017-10-20

## 2025-02-11 ENCOUNTER — APPOINTMENT (OUTPATIENT)
Dept: CARDIOLOGY | Facility: CLINIC | Age: 60
End: 2025-02-11
Payer: COMMERCIAL

## 2025-02-11 ENCOUNTER — NON-APPOINTMENT (OUTPATIENT)
Age: 60
End: 2025-02-11

## 2025-02-11 VITALS
OXYGEN SATURATION: 98 % | DIASTOLIC BLOOD PRESSURE: 76 MMHG | BODY MASS INDEX: 29.16 KG/M2 | HEART RATE: 50 BPM | SYSTOLIC BLOOD PRESSURE: 125 MMHG | WEIGHT: 215 LBS

## 2025-02-11 DIAGNOSIS — I25.5 ISCHEMIC CARDIOMYOPATHY: ICD-10-CM

## 2025-02-11 DIAGNOSIS — I70.90 UNSPECIFIED ATHEROSCLEROSIS: ICD-10-CM

## 2025-02-11 DIAGNOSIS — I21.09 ST ELEVATION (STEMI) MYOCARDIAL INFARCTION INVOLVING OTHER CORONARY ARTERY OF ANTERIOR WALL: ICD-10-CM

## 2025-02-11 PROCEDURE — 99214 OFFICE O/P EST MOD 30 MIN: CPT | Mod: 25

## 2025-02-11 PROCEDURE — 93000 ELECTROCARDIOGRAM COMPLETE: CPT

## 2025-02-11 RX ORDER — SILDENAFIL 100 MG/1
100 TABLET, FILM COATED ORAL
Qty: 10 | Refills: 3 | Status: ACTIVE | COMMUNITY
Start: 2025-02-11 | End: 1900-01-01

## 2025-04-28 ENCOUNTER — RX RENEWAL (OUTPATIENT)
Age: 60
End: 2025-04-28

## 2025-05-27 ENCOUNTER — RX RENEWAL (OUTPATIENT)
Age: 60
End: 2025-05-27

## 2025-05-29 NOTE — DISCHARGE NOTE NURSING/CASE MANAGEMENT/SOCIAL WORK - NSDPLANG ASIS_GEN_ALL_CORE
The patient returns today to follow-up after excision of his posterior scalp mass.  Pathology revealed this to be a benign lipoma.    Sutures are removed today.  The incision is well-healed.    The patient is doing well.  He may return to see me on an as needed basis.    Jere Vale MD  [facility]    Please route or send letter to:  Primary Care Provider (PCP)     No

## 2025-06-23 ENCOUNTER — RX RENEWAL (OUTPATIENT)
Age: 60
End: 2025-06-23

## 2025-07-26 ENCOUNTER — RX RENEWAL (OUTPATIENT)
Age: 60
End: 2025-07-26

## 2025-08-19 ENCOUNTER — APPOINTMENT (OUTPATIENT)
Dept: CARDIOLOGY | Facility: CLINIC | Age: 60
End: 2025-08-19

## 2025-08-19 VITALS
WEIGHT: 212 LBS | SYSTOLIC BLOOD PRESSURE: 109 MMHG | DIASTOLIC BLOOD PRESSURE: 67 MMHG | BODY MASS INDEX: 28.75 KG/M2 | HEART RATE: 47 BPM | OXYGEN SATURATION: 96 %